# Patient Record
Sex: MALE | Race: WHITE | NOT HISPANIC OR LATINO | Employment: OTHER | URBAN - METROPOLITAN AREA
[De-identification: names, ages, dates, MRNs, and addresses within clinical notes are randomized per-mention and may not be internally consistent; named-entity substitution may affect disease eponyms.]

---

## 2017-07-17 ENCOUNTER — OFFICE VISIT (OUTPATIENT)
Dept: MEDICAL GROUP | Facility: PHYSICIAN GROUP | Age: 32
End: 2017-07-17
Payer: OTHER GOVERNMENT

## 2017-07-17 VITALS
TEMPERATURE: 98.4 F | DIASTOLIC BLOOD PRESSURE: 76 MMHG | RESPIRATION RATE: 14 BRPM | WEIGHT: 176 LBS | OXYGEN SATURATION: 95 % | BODY MASS INDEX: 23.33 KG/M2 | SYSTOLIC BLOOD PRESSURE: 120 MMHG | HEIGHT: 73 IN | HEART RATE: 85 BPM

## 2017-07-17 DIAGNOSIS — R06.02 SHORTNESS OF BREATH: ICD-10-CM

## 2017-07-17 DIAGNOSIS — Z76.89 ENCOUNTER TO ESTABLISH CARE: ICD-10-CM

## 2017-07-17 DIAGNOSIS — R00.2 HEART PALPITATIONS: ICD-10-CM

## 2017-07-17 PROCEDURE — 93000 ELECTROCARDIOGRAM COMPLETE: CPT | Performed by: NURSE PRACTITIONER

## 2017-07-17 PROCEDURE — 99204 OFFICE O/P NEW MOD 45 MIN: CPT | Performed by: NURSE PRACTITIONER

## 2017-07-17 RX ORDER — IBUPROFEN 200 MG
200 TABLET ORAL EVERY 6 HOURS PRN
COMMUNITY
End: 2017-12-19

## 2017-07-17 RX ORDER — ALBUTEROL SULFATE 90 UG/1
2 AEROSOL, METERED RESPIRATORY (INHALATION) EVERY 6 HOURS PRN
Qty: 8.5 G | Refills: 1 | Status: SHIPPED
Start: 2017-07-17 | End: 2017-12-19

## 2017-07-17 ASSESSMENT — PATIENT HEALTH QUESTIONNAIRE - PHQ9: CLINICAL INTERPRETATION OF PHQ2 SCORE: 0

## 2017-07-17 NOTE — ASSESSMENT & PLAN NOTE
During [employer] physical test he felt some irregular heart beats.  Only when he really exerts himself.  Lasted 20 minutes.  Felt like he was going to pass out.  [place] that he over-exerted himself.  Felt concerned, but no chest pain.  Positive for nausea.  Has had this occur one or two times when not exerting himself.

## 2017-07-17 NOTE — PROGRESS NOTES
Romero Baugh is a 31 y.o. male here today to establish care and for evaluation and management of:    HPI:    Shortness of breath  Patient reports new onset of shortness of breath especially with physical training and exercise. He is an [employer] . Patient states that he is not sure if this is allergies or to problems with the fire. He denies chest pain wheezing recent fever or chills.     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  Only when he really exerts himself.  Lasted 20 minutes.  Felt like he was going to pass out.  [place] that he over-exerted himself.  Felt concerned, but no chest pain.  Positive for nausea.  Has had this occur one or two times when not exerting himself.      Encounter to establish care  Patient here to establish care. Romero is a 31-year-old white male. We reviewed medical and surgical history. He has an allergy to shellfish that causes swelling in his throat. He has had a septorhinoplasty done for chronic sinus issues and deviated septum. He is not currently on any prescription medication.      Current medicines (including changes today)  Current Outpatient Prescriptions   Medication Sig Dispense Refill   • ibuprofen (MOTRIN) 200 MG Tab Take 200 mg by mouth every 6 hours as needed.     • albuterol 108 (90 BASE) MCG/ACT Aero Soln inhalation aerosol Inhale 2 Puffs by mouth every 6 hours as needed for Shortness of Breath. 8.5 g 1     No current facility-administered medications for this visit.       He  has no past medical history on file.    He  has past surgical history that includes septorhinoplasty (2009).    Social History   Substance Use Topics   • Smoking status: Former Smoker -- 1.00 packs/day for 15 years     Types: Cigarettes     Quit date: 03/01/2016   • Smokeless tobacco: Former User   • Alcohol Use: No       Social History     Social History Narrative   • No narrative on file       Family History   Problem Relation Age of Onset   •  "Heart Disease Mother      valve replacment   • Hypertension Father    • Stroke Father    • Alcohol/Drug Father        Family Status   Relation Status Death Age   • Mother Alive    • Father Alive    • Sister Alive          ROS  As stated in history of present illness.  All other systems reviewed and are negative     Objective:     Blood pressure 120/76, pulse 85, temperature 36.9 °C (98.4 °F), resp. rate 14, height 1.854 m (6' 1\"), weight 79.833 kg (176 lb), SpO2 95 %. Body mass index is 23.23 kg/(m^2).  Physical Exam:    Constitutional: Alert, no distress.  Skin: Warm, dry, good turgor, no rashes in visible areas.  Eye: Equal, round and reactive, conjunctiva clear, lids normal.  ENMT: Lips without lesions, good dentition, oropharynx clear.  Neck: Trachea midline, no masses, no thyromegaly. No cervical or supraclavicular lymphadenopathy.  Respiratory: Unlabored respiratory effort, lungs clear to auscultation, no wheezes, no ronchi.  Cardiovascular: Normal S1, S2, no murmur, no edema.  Abdomen: Soft, non-tender, no masses, no hepatosplenomegaly.  Psych: Alert and oriented x3, normal affect and mood.        Assessment and Plan:   The following treatment plan was discussed    1. Encounter to establish care  31-year-old white male here to establish care. Reviewed medical and surgical history. Patient is not currently on any medications. He does have new onset of shortness of breath with exertion and a history of some heart palpitations.    2. Shortness of breath  This is a new problem to me. Acute. Ongoing. This could represent some exercise-induced asthma however on exam he had no wheezing. This could also represent allergies. Recommended that he try over-the-counter allergy medication and Flonase to see if this improves his symptoms. Also gave the patient an albuterol inhaler to try if he gets short of breath to see if this improves his symptoms. I did caution the patient that an albuterol inhaler can cause some heart " palpitations. We'll monitor and follow.    3. Heart palpitations  This is a new problem to me. Acute. Unsure of cause. EKG in the office showed normal sinus rhythm, no ST elevations, no bundle branch block or SVT or PVCs. We will draw lab work to rule out any metabolic issues including thyroid. Discussed with patient that if he were to have these symptoms again to try the Valsalva maneuver to see if this would stop his palpitations. ED precautions reviewed carefully with patient monitor and follow.  - COMP METABOLIC PANEL; Future  - TSH; Future  - LIPID PROFILE; Future  - EKG - Clinic Performed      Records requested.  Followup: Return if symptoms worsen or fail to improve.

## 2017-07-17 NOTE — MR AVS SNAPSHOT
"        Romero Baugh   2017 2:40 PM   Office Visit   MRN: [id number]    Department:  [facility]   Dept Phone:  [phone]    Description:  Male : 1985   Provider:  SHILO Hdz           Reason for Visit     Establish Care     Breathing Problem           Allergies as of 2017     Allergen Noted Reactions    Iodine 2017       Shellfish Allergy 2017         You were diagnosed with     Encounter to establish care   [204497]       Shortness of breath   [786.05.ICD-9-CM]       Heart palpitations   [822165]         Vital Signs     Blood Pressure Pulse Temperature Respirations Height Weight    120/76 mmHg 85 36.9 °C (98.4 °F) 14 1.854 m (6' 1\") 79.833 kg (176 lb)    Body Mass Index Oxygen Saturation Smoking Status             23.23 kg/m2 95% Former Smoker         Basic Information     Date Of Birth Sex Race Ethnicity Preferred Language    1985 Male White Non- English      Problem List              ICD-10-CM Priority Class Noted - Resolved    Encounter to establish care Z76.89   2017 - Present    Shortness of breath R06.02   2017 - Present    Heart palpitations R00.2   2017 - Present      Health Maintenance     Patient has no pending health maintenance at this time      Results     EKG - Clinic Performed                   Current Immunizations     No immunizations on file.      Below and/or attached are the medications your provider expects you to take. Review all of your home medications and newly ordered medications with your provider and/or pharmacist. Follow medication instructions as directed by your provider and/or pharmacist. Please keep your medication list with you and share with your provider. Update the information when medications are discontinued, doses are changed, or new medications (including over-the-counter products) are added; and carry medication information at all times in the event of emergency situations     Allergies:  " IODINE - (reactions not documented)     SHELLFISH ALLERGY - (reactions not documented)               Medications  Valid as of: July 17, 2017 -  3:35 PM    Generic Name Brand Name Tablet Size Instructions for use    Albuterol Sulfate (Aero Soln) albuterol 108 (90 BASE) MCG/ACT Inhale 2 Puffs by mouth every 6 hours as needed for Shortness of Breath.        Ibuprofen (Tab) MOTRIN 200 MG Take 200 mg by mouth every 6 hours as needed.        .                 Medicines prescribed today were sent to:     [id number]  [place], [place] - [address] AT [facility]    [address] [place] [address]    Phone: [phone] Fax: [phone]    Open 24 Hours?: No      Medication refill instructions:       If your prescription bottle indicates you have medication refills left, it is not necessary to call your provider’s office. Please contact your pharmacy and they will refill your medication.    If your prescription bottle indicates you do not have any refills left, you may request refills at any time through one of the following ways: The online [employer] system (except Urgent Care), by calling your provider’s office, or by asking your pharmacy to contact your provider’s office with a refill request. Medication refills are processed only during regular business hours and may not be available until the next business day. Your provider may request additional information or to have a follow-up visit with you prior to refilling your medication.   *Please Note: Medication refills are assigned a new Rx number when refilled electronically. Your pharmacy may indicate that no refills were authorized even though a new prescription for the same medication is available at the pharmacy. Please request the medicine by name with the pharmacy before contacting your provider for a refill.        Your To Do List     Future Labs/Procedures Complete By Expires    COMP METABOLIC PANEL  As directed 7/18/2018    LIPID  PROFILE  As directed 7/18/2018    TSH  As directed 7/18/2018         D[employer] Access Code: [email or web address]  Expires: 8/5/2017 10:24 AM    Dinsmore Steele  A secure, online tool to manage your health information     [employer]’s Dinsmore Steele® is a secure, online tool that connects you to your personalized health information from the privacy of your home -- day or night - making it very easy for you to manage your healthcare. Once the activation process is completed, you can even access your medical information using the Dinsmore Steele pio, which is available for free in the Apple Pio store or Google Play store.     Dinsmore Steele provides the following levels of access (as shown below):   My Chart Features   [facility] Doctor [facility]  Specialists [facility]  Urgent  Care Non-[facility]  Primary Care  Doctor   Email your healthcare team securely and privately 24/7 X X X    Manage appointments: schedule your next appointment; view details of past/upcoming appointments X      Request prescription refills. X      View recent personal medical records, including lab and immunizations X X X X   View health record, including health history, allergies, medications X X X X   Read reports about your outpatient visits, procedures, consult and ER notes X X X X   See your discharge summary, which is a recap of your hospital and/or ER visit that includes your diagnosis, lab results, and care plan. X X       How to register for Dinsmore Steele:  1. Go to  [email or web address].  2. Click on the Sign Up Now box, which takes you to the New Member Sign Up page. You will need to provide the following information:  a. Enter your Dinsmore Steele Access Code exactly as it appears at the top of this page. (You will not need to use this code after you’ve completed the sign-up process. If you do not sign up before the expiration date, you must request a new code.)   b. Enter your date of birth.   c. Enter your home email address.   d. Click Submit, and follow the next  screen’s instructions.  3. Create a [employer] ID. This will be your [employer] login ID and cannot be changed, so think of one that is secure and easy to remember.  4. Create a [employer] password. You can change your password at any time.  5. Enter your Password Reset Question and Answer. This can be used at a later time if you forget your password.   6. Enter your e-mail address. This allows you to receive e-mail notifications when new information is available in [employer].  7. Click Sign Up. You can now view your health information.    For assistance activating your [employer] account, call [phone]

## 2017-07-17 NOTE — ASSESSMENT & PLAN NOTE
Patient here to establish care. Romero is a 31-year-old white male. We reviewed medical and surgical history. He has an allergy to shellfish that causes swelling in his throat. He has had a septorhinoplasty done for chronic sinus issues and deviated septum. He is not currently on any prescription medication.

## 2017-07-17 NOTE — ASSESSMENT & PLAN NOTE
Patient reports new onset of shortness of breath especially with physical training and exercise. He is an [employer] . Patient states that he is not sure if this is allergies or to problems with the fire. He denies chest pain wheezing recent fever or chills.

## 2017-12-06 ENCOUNTER — OFFICE VISIT (OUTPATIENT)
Dept: URGENT CARE | Facility: PHYSICIAN GROUP | Age: 32
End: 2017-12-06
Payer: OTHER GOVERNMENT

## 2017-12-06 VITALS
TEMPERATURE: 98.4 F | BODY MASS INDEX: 23.86 KG/M2 | OXYGEN SATURATION: 95 % | SYSTOLIC BLOOD PRESSURE: 112 MMHG | DIASTOLIC BLOOD PRESSURE: 80 MMHG | HEART RATE: 82 BPM | HEIGHT: 73 IN | WEIGHT: 180 LBS

## 2017-12-06 DIAGNOSIS — J01.00 ACUTE NON-RECURRENT MAXILLARY SINUSITIS: ICD-10-CM

## 2017-12-06 LAB
FLUAV+FLUBV AG SPEC QL IA: NORMAL
INT CON NEG: NEGATIVE
INT CON NEG: NEGATIVE
INT CON POS: POSITIVE
INT CON POS: POSITIVE
S PYO AG THROAT QL: NORMAL

## 2017-12-06 PROCEDURE — 87880 STREP A ASSAY W/OPTIC: CPT | Performed by: PHYSICIAN ASSISTANT

## 2017-12-06 PROCEDURE — 87804 INFLUENZA ASSAY W/OPTIC: CPT | Performed by: PHYSICIAN ASSISTANT

## 2017-12-06 PROCEDURE — 99213 OFFICE O/P EST LOW 20 MIN: CPT | Performed by: PHYSICIAN ASSISTANT

## 2017-12-06 ASSESSMENT — ENCOUNTER SYMPTOMS
MUSCULOSKELETAL NEGATIVE: 1
FEVER: 1
NAUSEA: 0
ABDOMINAL PAIN: 0
HEADACHES: 0
SPUTUM PRODUCTION: 0
CHILLS: 0
SHORTNESS OF BREATH: 0
DIARRHEA: 0
SINUS PAIN: 1
VOMITING: 0
DIZZINESS: 0
SORE THROAT: 1
COUGH: 1

## 2017-12-06 NOTE — LETTER
December 6, 2017         Patient: Romero Baugh   YOB: 1985   Date of Visit: 12/6/2017           To Whom it May Concern:    Romero Baugh was seen in my clinic on 12/6/2017. Please excuse his absence today, 12/6/17.    If you have any questions or concerns, please don't hesitate to call.        Sincerely,           Virginia Ashley P.A.-C.  Electronically Signed

## 2017-12-07 NOTE — PROGRESS NOTES
"Subjective:      Romero Baugh is a 32 y.o. male who presents with Fever (101. x 2 days, Sore throat, headache, chills.)            Fever    This is a new problem. The current episode started yesterday. The problem occurs constantly. The problem has been resolved. The maximum temperature noted was 101 to 101.9 F. The temperature was taken using an oral thermometer. Associated symptoms include congestion, coughing and a sore throat. Pertinent negatives include no abdominal pain, chest pain, diarrhea, ear pain, headaches, nausea, rash or vomiting. He has tried NSAIDs and acetaminophen for the symptoms.   Risk factors: recent sickness    Risk factors: no sick contacts      Patient presents to urgent care reporting a 2 day history of sinus congestions, dry cough, headaches, sore throat, and fever (highest measured 101 F yesterday). He has no known medical problems and doesn't take any regular medications. He has received his flu shot this year.     Review of Systems   Constitutional: Positive for fever. Negative for chills.   HENT: Positive for congestion, sinus pain and sore throat. Negative for ear pain.    Respiratory: Positive for cough. Negative for sputum production and shortness of breath.    Cardiovascular: Negative for chest pain.   Gastrointestinal: Negative for abdominal pain, diarrhea, nausea and vomiting.   Genitourinary: Negative.    Musculoskeletal: Negative.    Skin: Negative for rash.   Neurological: Negative for dizziness and headaches.        Objective:     /80   Pulse 82   Temp 36.9 °C (98.4 °F)   Ht 1.854 m (6' 1\")   Wt 81.6 kg (180 lb)   SpO2 95%   BMI 23.75 kg/m²      Physical Exam   Constitutional: He is oriented to person, place, and time. He appears well-developed and well-nourished. No distress.   HENT:   Head: Normocephalic and atraumatic.   Right Ear: Hearing, tympanic membrane, external ear and ear canal normal.   Left Ear: Hearing, tympanic membrane, external ear and ear " canal normal.   Nose: Mucosal edema and rhinorrhea present. Right sinus exhibits no maxillary sinus tenderness and no frontal sinus tenderness. Left sinus exhibits no maxillary sinus tenderness and no frontal sinus tenderness.   Mouth/Throat: Posterior oropharyngeal erythema present. No oropharyngeal exudate or posterior oropharyngeal edema.   Mild posterior oropharyngeal erythema without enlarged tonsils or exudate noted.    Eyes: Conjunctivae are normal. Pupils are equal, round, and reactive to light. Right eye exhibits no discharge. Left eye exhibits no discharge.   Neck: Normal range of motion.   Cardiovascular: Normal rate, regular rhythm and normal heart sounds.    No murmur heard.  Pulmonary/Chest: Effort normal and breath sounds normal. No respiratory distress. He has no wheezes.   Musculoskeletal: Normal range of motion.   Neurological: He is alert and oriented to person, place, and time.   Skin: Skin is warm and dry. He is not diaphoretic.   Psychiatric: He has a normal mood and affect. His behavior is normal.   Nursing note and vitals reviewed.         PMH:  has no past medical history on file.  MEDS:   Current Outpatient Prescriptions:   •  ibuprofen (MOTRIN) 200 MG Tab, Take 200 mg by mouth every 6 hours as needed., Disp: , Rfl:   •  albuterol 108 (90 BASE) MCG/ACT Aero Soln inhalation aerosol, Inhale 2 Puffs by mouth every 6 hours as needed for Shortness of Breath., Disp: 8.5 g, Rfl: 1  ALLERGIES:   Allergies   Allergen Reactions   • Iodine    • Shellfish Allergy      SURGHX:   Past Surgical History:   Procedure Laterality Date   • SEPTORHINOPLASTY  2009     SOCHX:  reports that he quit smoking about 21 months ago. His smoking use included Cigarettes. He has a 15.00 pack-year smoking history. He has quit using smokeless tobacco. He reports that he does not drink alcohol or use drugs.  FH: family history includes Alcohol/Drug in his father; Heart Disease in his mother; Hypertension in his father; Stroke  in his father.       Assessment/Plan:     1. Acute non-recurrent maxillary sinusitis  - POCT Influenza A/B: NEGATIVE  - POCT Rapid Strep A: NEGATIVE    Advised patient symptoms are most likely viral in etiology, recommend supportive care. Increased fluids and rest. Discussed use of nedi-pot, humidifier, and Flonase nasal spray for symptomatic relief. Call or return to office if symptoms persist or worsen. Work note given at today's visit. The patient demonstrated a good understanding and agreed with the treatment plan.

## 2017-12-19 ENCOUNTER — APPOINTMENT (OUTPATIENT)
Dept: RADIOLOGY | Facility: MEDICAL CENTER | Age: 32
End: 2017-12-19
Attending: EMERGENCY MEDICINE
Payer: OTHER GOVERNMENT

## 2017-12-19 ENCOUNTER — HOSPITAL ENCOUNTER (EMERGENCY)
Facility: MEDICAL CENTER | Age: 32
End: 2017-12-19
Attending: EMERGENCY MEDICINE
Payer: OTHER GOVERNMENT

## 2017-12-19 ENCOUNTER — TELEPHONE (OUTPATIENT)
Dept: MEDICAL GROUP | Facility: PHYSICIAN GROUP | Age: 32
End: 2017-12-19

## 2017-12-19 VITALS
OXYGEN SATURATION: 96 % | HEART RATE: 109 BPM | DIASTOLIC BLOOD PRESSURE: 63 MMHG | SYSTOLIC BLOOD PRESSURE: 111 MMHG | TEMPERATURE: 100.4 F | HEIGHT: 74 IN | WEIGHT: 181.66 LBS | RESPIRATION RATE: 18 BRPM | BODY MASS INDEX: 23.31 KG/M2

## 2017-12-19 DIAGNOSIS — R50.9 FEVER, UNSPECIFIED FEVER CAUSE: ICD-10-CM

## 2017-12-19 DIAGNOSIS — J11.1 INFLUENZA: ICD-10-CM

## 2017-12-19 PROCEDURE — 71010 DX-CHEST-PORTABLE (1 VIEW): CPT

## 2017-12-19 PROCEDURE — A9270 NON-COVERED ITEM OR SERVICE: HCPCS | Performed by: EMERGENCY MEDICINE

## 2017-12-19 PROCEDURE — 700102 HCHG RX REV CODE 250 W/ 637 OVERRIDE(OP): Performed by: EMERGENCY MEDICINE

## 2017-12-19 PROCEDURE — 99284 EMERGENCY DEPT VISIT MOD MDM: CPT

## 2017-12-19 RX ORDER — OSELTAMIVIR PHOSPHATE 75 MG/1
75 CAPSULE ORAL 2 TIMES DAILY
Qty: 10 CAP | Refills: 0 | Status: SHIPPED | OUTPATIENT
Start: 2017-12-19 | End: 2017-12-24

## 2017-12-19 RX ORDER — IBUPROFEN 600 MG/1
600 TABLET ORAL ONCE
Status: COMPLETED | OUTPATIENT
Start: 2017-12-19 | End: 2017-12-19

## 2017-12-19 RX ORDER — ACETAMINOPHEN 325 MG/1
975 TABLET ORAL ONCE
Status: COMPLETED | OUTPATIENT
Start: 2017-12-19 | End: 2017-12-19

## 2017-12-19 RX ADMIN — ACETAMINOPHEN 975 MG: 325 TABLET, FILM COATED ORAL at 09:54

## 2017-12-19 RX ADMIN — IBUPROFEN 600 MG: 600 TABLET, FILM COATED ORAL at 09:54

## 2017-12-19 ASSESSMENT — PAIN SCALES - GENERAL: PAINLEVEL_OUTOF10: 6

## 2017-12-19 NOTE — ED PROVIDER NOTES
"ED Provider Note    CHIEF COMPLAINT  Chief Complaint   Patient presents with   • Flu Like Symptoms       HPI  Romero Baugh is a 32 y.o. male who presents To the emergency department with a chief complaint of fevers, chills, body aches, generally feeling terrible. The patient had some mild symptoms starting about 10 days ago but things seem to get worse yesterday. He developed a fever to 102° yesterday morning. He's had body aches, nausea, vomiting, diarrhea and general malaise.    REVIEW OF SYSTEMS  See HPI for further details. All other systems are negative.     PAST MEDICAL HISTORY  No past medical history on file.    FAMILY HISTORY  Family History   Problem Relation Age of Onset   • Heart Disease Mother      valve replacment   • Hypertension Father    • Stroke Father    • Alcohol/Drug Father        SOCIAL HISTORY  Social History     Social History   • Marital status:      Spouse name: N/A   • Number of children: N/A   • Years of education: N/A     Social History Main Topics   • Smoking status: Former Smoker     Packs/day: 1.00     Years: 15.00     Types: Cigarettes     Quit date: 3/1/2016   • Smokeless tobacco: Former User   • Alcohol use No   • Drug use: No   • Sexual activity: Yes     Partners: Female     Other Topics Concern   • Not on file     Social History Narrative   • No narrative on file       SURGICAL HISTORY  Past Surgical History:   Procedure Laterality Date   • SEPTORHINOPLASTY  2009       CURRENT MEDICATIONS  Home Medications     Reviewed by Gina Joiner (Pharmacy Tech) on 12/19/17 at 0936  Med List Status: Complete   Medication Last Dose Status   pseudoephedrine-ibuprofen (CHILDRENS ADVIL COLD)  MG/5ML suspension 12/18/2017 Active                ALLERGIES  Allergies   Allergen Reactions   • Iodine    • Shellfish Allergy        PHYSICAL EXAM  VITAL SIGNS: /63   Pulse (!) 109   Temp 38 °C (100.4 °F)   Resp 18   Ht 1.88 m (6' 2\")   Wt 82.4 kg (181 lb 10.5 oz)   " SpO2 96%   BMI 23.32 kg/m²   Constitutional: Well developed, Well nourished, No acute distress, Non-toxic appearance.   HENT: Normocephalic, Atraumatic, Bilateral external ears normal, Oropharynx moist, No oral exudates, Nose normal.   Eyes: PERRLA, EOMI, Conjunctiva normal, No discharge.   Neck: Normal range of motion, No tenderness, Supple, No stridor.   Cardiovascular: Tachycardic, regular rhythm, no murmurs.   Thorax & Lungs: Lungs are clear to auscultation bilaterally. No rales, rhonchi, or wheezing. Normal work of breathing.  Abdomen: Bowel sounds normal, Soft, No tenderness, No masses, No pulsatile masses.   Skin: Warm, Dry, No erythema, No rash.   Back: No tenderness, No CVA tenderness.   Extremities: Intact distal pulses, No tenderness, No cyanosis, No clubbing.  Neurologic: Alert & oriented x 3, Normal motor function, Normal sensory function, No focal deficits noted.     EKG      RADIOLOGY/PROCEDURES      COURSE & MEDICAL DECISION MAKING  Pertinent Labs & Imaging studies reviewed. (See chart for details)    The patient presents to the emergency department with flulike symptoms. This occurs within the context of multiple patients presenting to the emergency department with influenza. Chest x-ray is obtained and does not reveal any evidence of pneumonia. Fever started yesterday. Therefore I do feel that the patient is in the window for treatment with Tamiflu.    Otherwise, recommended symptomatically control. Patient discharged home in stable condition.    FINAL IMPRESSION  1. Influenza              Electronically signed by: Jose Coulter, 12/19/2017 11:07 AM

## 2017-12-19 NOTE — DISCHARGE INSTRUCTIONS
"Influenza, Adult  Influenza (\"the flu\") is a viral infection of the respiratory tract. It occurs more often in winter months because people spend more time in close contact with one another. Influenza can make you feel very sick. Influenza easily spreads from person to person (contagious).  CAUSES   Influenza is caused by a virus that infects the respiratory tract. You can catch the virus by breathing in droplets from an infected person's cough or sneeze. You can also catch the virus by touching something that was recently contaminated with the virus and then touching your mouth, nose, or eyes.  RISKS AND COMPLICATIONS  You may be at risk for a more severe case of influenza if you smoke cigarettes, have diabetes, have chronic heart disease (such as heart failure) or lung disease (such as asthma), or if you have a weakened immune system. Elderly people and pregnant women are also at risk for more serious infections. The most common problem of influenza is a lung infection (pneumonia). Sometimes, this problem can require emergency medical care and may be life threatening.  SIGNS AND SYMPTOMS   Symptoms typically last 4 to 10 days and may include:  · Fever.  · Chills.  · Headache, body aches, and muscle aches.  · Sore throat.  · Chest discomfort and cough.  · Poor appetite.  · Weakness or feeling tired.  · Dizziness.  · Nausea or vomiting.  DIAGNOSIS   Diagnosis of influenza is often made based on your history and a physical exam. A nose or throat swab test can be done to confirm the diagnosis.  TREATMENT   In mild cases, influenza goes away on its own. Treatment is directed at relieving symptoms. For more severe cases, your health care provider may prescribe antiviral medicines to shorten the sickness. Antibiotic medicines are not effective because the infection is caused by a virus, not by bacteria.  HOME CARE INSTRUCTIONS  · Take medicines only as directed by your health care provider.  · Use a cool mist humidifier " to make breathing easier.  · Get plenty of rest until your temperature returns to normal. This usually takes 3 to 4 days.  · Drink enough fluid to keep your urine clear or pale yellow.  · Cover your mouth and nose when coughing or sneezing, and wash your hands well to prevent the virus from spreading.  · Stay home from work or school until the fever is gone for at least 1 full day.  PREVENTION   An annual influenza vaccination (flu shot) is the best way to avoid getting influenza. An annual flu shot is now routinely recommended for all adults in the [place]  SEEK MEDICAL CARE IF:  · You experience chest pain, your cough worsens, or you produce more mucus.  · You have nausea, vomiting, or diarrhea.  · Your fever returns or gets worse.  SEEK IMMEDIATE MEDICAL CARE IF:  · You have trouble breathing, you become short of breath, or your skin or nails become bluish.  · You have severe pain or stiffness in the neck.  · You develop a sudden headache, or pain in the face or ear.  · You have nausea or vomiting that you cannot control.  MAKE SURE YOU:   · Understand these instructions.  · Will watch your condition.  · Will get help right away if you are not doing well or get worse.     This information is not intended to replace advice given to you by your health care provider. Make sure you discuss any questions you have with your health care provider.     Document Released: 12/15/2001 Document Revised: 01/08/2016 Document Reviewed: 03/18/2013  [employer] Patient Education ©2016 [employer]

## 2017-12-19 NOTE — TELEPHONE ENCOUNTER
VOICEMAIL  1. Caller Name:Romero Baugh                                                                  Call Back Number: [phone] (home)       2. Message: Patient has been up all night with fever and chills, he has  and needs a referral to be seen in UC. Please place URGENT referral to UC.     3. Patient approves office to leave a detailed voicemail/MyChart message: N\A

## 2018-03-19 ENCOUNTER — OFFICE VISIT (OUTPATIENT)
Dept: URGENT CARE | Facility: PHYSICIAN GROUP | Age: 33
End: 2018-03-19
Payer: OTHER GOVERNMENT

## 2018-03-19 ENCOUNTER — HOSPITAL ENCOUNTER (OUTPATIENT)
Dept: RADIOLOGY | Facility: MEDICAL CENTER | Age: 33
End: 2018-03-19
Attending: NURSE PRACTITIONER
Payer: OTHER GOVERNMENT

## 2018-03-19 VITALS
TEMPERATURE: 97.6 F | RESPIRATION RATE: 14 BRPM | OXYGEN SATURATION: 96 % | HEART RATE: 78 BPM | WEIGHT: 180 LBS | HEIGHT: 72 IN | DIASTOLIC BLOOD PRESSURE: 78 MMHG | SYSTOLIC BLOOD PRESSURE: 124 MMHG | BODY MASS INDEX: 24.38 KG/M2

## 2018-03-19 DIAGNOSIS — S69.92XA INJURY OF LEFT WRIST, INITIAL ENCOUNTER: ICD-10-CM

## 2018-03-19 DIAGNOSIS — S62.112A CLOSED DISPLACED FRACTURE OF TRIQUETRUM OF LEFT WRIST, INITIAL ENCOUNTER: ICD-10-CM

## 2018-03-19 PROCEDURE — 73110 X-RAY EXAM OF WRIST: CPT | Mod: LT

## 2018-03-19 PROCEDURE — 99213 OFFICE O/P EST LOW 20 MIN: CPT | Performed by: NURSE PRACTITIONER

## 2018-03-19 ASSESSMENT — PAIN SCALES - GENERAL: PAINLEVEL: 4=SLIGHT-MODERATE PAIN
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
## 2018-03-20 ENCOUNTER — TELEPHONE (OUTPATIENT)
Dept: MEDICAL GROUP | Facility: PHYSICIAN GROUP | Age: 33
End: 2018-03-20

## 2018-03-20 DIAGNOSIS — S62.112A CLOSED DISPLACED FRACTURE OF TRIQUETRUM OF LEFT WRIST, INITIAL ENCOUNTER: ICD-10-CM

## 2018-03-20 ASSESSMENT — ENCOUNTER SYMPTOMS
RESPIRATORY NEGATIVE: 1
HEADACHES: 0
BACK PAIN: 0
CARDIOVASCULAR NEGATIVE: 1
TINGLING: 1
GASTROINTESTINAL NEGATIVE: 1
NECK PAIN: 0
FOCAL WEAKNESS: 0
CONSTITUTIONAL NEGATIVE: 1
LOSS OF CONSCIOUSNESS: 0
FALLS: 0
DIZZINESS: 0

## 2018-03-20 NOTE — TELEPHONE ENCOUNTER
Urgent referral to [facility] placed for S62.112A  Left wrist displaced fracture.   GERALD Hdz.

## 2018-03-20 NOTE — TELEPHONE ENCOUNTER
1. Caller Name: Romero Baugh                                         Call Back Number: [phone] (home)       Patient approves a detailed voicemail message: N\A    Patient called for an update on a referrals to [facility] for his fractured wrist. A referral was submitted to referral department  And looks like Submitted to . Called the  to transfer me to referral department to follow up and she transfer me to Anaid the supervisor left message on her voice mail

## 2018-03-20 NOTE — TELEPHONE ENCOUNTER
VOICEMAIL  1. Caller Name: Romero                      Call Back Number: [phone] (home)       2. Message: Patient needs an URGENT referral to [facility] for S62.112A (ICD-10-CM) - Closed displaced fracture of triquetrum of left wrist, initial encounter.    3. Patient approves office to leave a detailed voicemail/MyChart message: N\A

## 2018-03-20 NOTE — TELEPHONE ENCOUNTER
PER RAH FROST @ [facility], CARLOS IS ONLY DOING WAVERS, SHE WILL FAX TO [facility]

## 2018-05-09 ENCOUNTER — OFFICE VISIT (OUTPATIENT)
Dept: INTERNAL MEDICINE | Facility: MEDICAL CENTER | Age: 33
End: 2018-05-09
Payer: OTHER GOVERNMENT

## 2018-05-09 VITALS
BODY MASS INDEX: 25.87 KG/M2 | HEART RATE: 104 BPM | WEIGHT: 191 LBS | SYSTOLIC BLOOD PRESSURE: 116 MMHG | HEIGHT: 72 IN | OXYGEN SATURATION: 93 % | DIASTOLIC BLOOD PRESSURE: 68 MMHG | TEMPERATURE: 98.6 F

## 2018-05-09 DIAGNOSIS — S62.102D CLOSED FRACTURE OF LEFT WRIST WITH ROUTINE HEALING, SUBSEQUENT ENCOUNTER: ICD-10-CM

## 2018-05-09 PROCEDURE — 99212 OFFICE O/P EST SF 10 MIN: CPT | Mod: GE | Performed by: INTERNAL MEDICINE

## 2018-05-09 RX ORDER — IBUPROFEN 200 MG
200 TABLET ORAL EVERY 6 HOURS PRN
COMMUNITY
End: 2020-03-10

## 2018-05-09 ASSESSMENT — PAIN SCALES - GENERAL: PAINLEVEL: 2=MINIMAL-SLIGHT
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
## 2018-05-09 NOTE — PATIENT INSTRUCTIONS
Continue to work on strengthening your wrist - can use stress ball for exercise  Refrain from lifting heavy weights.  Follow up as needed for worsening symptoms
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
## 2018-10-29 ENCOUNTER — OFFICE VISIT (OUTPATIENT)
Dept: MEDICAL GROUP | Facility: PHYSICIAN GROUP | Age: 33
End: 2018-10-29
Payer: OTHER GOVERNMENT

## 2018-10-29 VITALS
OXYGEN SATURATION: 96 % | BODY MASS INDEX: 25.47 KG/M2 | WEIGHT: 188 LBS | HEART RATE: 80 BPM | TEMPERATURE: 98.5 F | SYSTOLIC BLOOD PRESSURE: 106 MMHG | HEIGHT: 72 IN | DIASTOLIC BLOOD PRESSURE: 64 MMHG

## 2018-10-29 DIAGNOSIS — J06.9 UPPER RESPIRATORY TRACT INFECTION, UNSPECIFIED TYPE: ICD-10-CM

## 2018-10-29 LAB
FLUAV+FLUBV AG SPEC QL IA: NORMAL
INT CON NEG: NORMAL
INT CON POS: NORMAL

## 2018-10-29 PROCEDURE — 99213 OFFICE O/P EST LOW 20 MIN: CPT | Performed by: PHYSICIAN ASSISTANT

## 2018-10-29 PROCEDURE — 87804 INFLUENZA ASSAY W/OPTIC: CPT | Performed by: PHYSICIAN ASSISTANT

## 2018-10-29 RX ORDER — FLUTICASONE PROPIONATE 50 MCG
1 SPRAY, SUSPENSION (ML) NASAL DAILY
COMMUNITY
End: 2020-03-10

## 2018-10-29 NOTE — PROGRESS NOTES
Subjective:   Romero Baugh is a 33 y.o. male here today for nasal congestion. Is an established patient of EMELIA Hdz.    HPI:    Patient presents to the office today with complaints of upper respiratory symptoms.  He states that for the last 2 days, he has had nasal congestion, sinus headache, and cough.  Has noticed a lot of postnasal drainage which makes him nauseous at times.  Has had persistent fever since symptom onset.  T-max up to 102 at home accompanied by nocturnal chills.  Also complains of body aches and fatigue.  Feels short of breath which has been an ongoing issue.  His PCP previously prescribed him albuterol but states he has been afraid to start this as he does not like taking medications.  Has been using DayQuil and NyQuil at home without much symptomatic relief.  Does endorse getting the flu shot a couple of weeks ago.      Current medicines (including changes today)  Current Outpatient Prescriptions   Medication Sig Dispense Refill   • Pseudoeph-Doxylamine-DM-APAP (DAYQUIL/NYQUIL COLD/FLU RELIEF PO) Take  by mouth.     • fluticasone (FLONASE) 50 MCG/ACT nasal spray Spray 1 Spray in nose every day.     • ibuprofen (MOTRIN) 200 MG Tab Take 200 mg by mouth every 6 hours as needed.     • asa/apap/caffeine (EXCEDRIN) 250-250-65 MG Tab Take 1 Tab by mouth every 6 hours as needed for Headache.       No current facility-administered medications for this visit.      He  has no past medical history on file.    ROS  Ear ROS: No ear pain  Mouth/Throat ROS: No sore throat  Neck ROS: Positive for swollen glands       Objective:     Blood pressure 106/64, pulse 80, temperature 36.9 °C (98.5 °F), height 1.829 m (6'), weight 85.3 kg (188 lb), SpO2 96 %. Body mass index is 25.5 kg/m².     Physical Exam:  Constitutional: Alert, well-appearing, no distress.  Skin: Warm, dry, good turgor, no rashes in visible areas.  Eye: Pupils are equal and round, conjunctiva clear, lids normal.  ENMT: External ear  canals show mild to moderate amount of dried cerumen but no erythema or edema.  Tympanic membranes not visible due to wax.  Nasal turbinates appear mildly inflamed and injected with no rhinorrhea currently visible.  Lips without lesions, moist mucus membranes.  Mild pharyngeal erythema.  Neck: No masses.  Mild non-tender submandibular adenopathy bilaterally. No cervical lymphadenopathy.  Respiratory: Unlabored respiratory effort, SPO2 96% on room air.  Lungs clear to auscultation, no wheezes, no rhonchi.  Cardiovascular: Normal S1, S2, no murmur.      Assessment and Plan:   The following treatment plan was discussed    1. Upper respiratory tract infection, unspecified type  New problem, uncontrolled.  History consistent with upper respiratory infection, likely viral.  Influenza test was performed which came back negative.  Patient counseled on supportive management of his symptoms including staying well-hydrated, frequent saline nasal irrigation, and over-the-counter decongestant/cough medicine as needed.  Follow-up if no improvement in symptoms by the 10-day kevin of illness.  - POCT Influenza A/B      Followup: Return if symptoms worsen or fail to improve.    Virginia Kern P.A.-C.

## 2018-10-29 NOTE — LETTER
October 29, 2018         Patient: Romero Baugh   YOB: 1985   Date of Visit: 10/29/2018           To Whom it May Concern:    Romero Baugh was seen in my clinic on 10/29/2018. Please excuse his absence from work on 10/29/2018 and 10/30/2018. Thank you.    If you have any questions or concerns, please don't hesitate to call.        Sincerely,           Virginia Kern P.A.-C.  Electronically Signed

## 2019-08-13 ENCOUNTER — OFFICE VISIT (OUTPATIENT)
Dept: URGENT CARE | Facility: PHYSICIAN GROUP | Age: 34
End: 2019-08-13
Payer: OTHER GOVERNMENT

## 2019-08-13 VITALS
WEIGHT: 169 LBS | SYSTOLIC BLOOD PRESSURE: 124 MMHG | TEMPERATURE: 98.6 F | OXYGEN SATURATION: 96 % | HEART RATE: 74 BPM | HEIGHT: 72 IN | BODY MASS INDEX: 22.89 KG/M2 | DIASTOLIC BLOOD PRESSURE: 78 MMHG | RESPIRATION RATE: 16 BRPM

## 2019-08-13 DIAGNOSIS — R11.0 NAUSEA: ICD-10-CM

## 2019-08-13 PROCEDURE — 99214 OFFICE O/P EST MOD 30 MIN: CPT | Performed by: PHYSICIAN ASSISTANT

## 2019-08-13 RX ORDER — ONDANSETRON 4 MG/1
4 TABLET, FILM COATED ORAL EVERY 6 HOURS PRN
Qty: 20 TAB | Refills: 1 | Status: SHIPPED | OUTPATIENT
Start: 2019-08-13 | End: 2019-09-11

## 2019-08-13 RX ORDER — ONDANSETRON 4 MG/1
4 TABLET, ORALLY DISINTEGRATING ORAL ONCE
Status: COMPLETED | OUTPATIENT
Start: 2019-08-13 | End: 2019-08-13

## 2019-08-13 RX ADMIN — ONDANSETRON 4 MG: 4 TABLET, ORALLY DISINTEGRATING ORAL at 10:10

## 2019-08-13 ASSESSMENT — ENCOUNTER SYMPTOMS
DIARRHEA: 1
FEVER: 0
NAUSEA: 1
CONSTIPATION: 0
BLOOD IN STOOL: 0
FOCAL WEAKNESS: 0
CHILLS: 0
HEADACHES: 1
SORE THROAT: 1
VOMITING: 0
COUGH: 1
LOSS OF CONSCIOUSNESS: 0
SPUTUM PRODUCTION: 0
FLANK PAIN: 0
SENSORY CHANGE: 0
SHORTNESS OF BREATH: 0
WHEEZING: 0
ABDOMINAL PAIN: 0
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
## 2019-08-13 NOTE — LETTER
August 13, 2019       Patient: Romero Baugh   YOB: 1985   Date of Visit: 8/13/2019         To Whom It May Concern:    It is my medical opinion that Romero Baugh should be excused from work for today and tomorrow due to illness; he is permitted to return to work without concern thereafter (on Thursday of this week).       If you have any questions or concerns, please don't hesitate to call [phone]          Sincerely,          Pilo Claire P.A.-C.  Electronically Signed

## 2019-09-09 ENCOUNTER — APPOINTMENT (OUTPATIENT)
Dept: RADIOLOGY | Facility: MEDICAL CENTER | Age: 34
End: 2019-09-09
Attending: EMERGENCY MEDICINE
Payer: OTHER GOVERNMENT

## 2019-09-09 ENCOUNTER — HOSPITAL ENCOUNTER (EMERGENCY)
Facility: MEDICAL CENTER | Age: 34
End: 2019-09-09
Attending: EMERGENCY MEDICINE
Payer: OTHER GOVERNMENT

## 2019-09-09 VITALS
BODY MASS INDEX: 25.18 KG/M2 | HEART RATE: 88 BPM | OXYGEN SATURATION: 97 % | TEMPERATURE: 97.2 F | RESPIRATION RATE: 16 BRPM | DIASTOLIC BLOOD PRESSURE: 74 MMHG | SYSTOLIC BLOOD PRESSURE: 117 MMHG | WEIGHT: 190 LBS | HEIGHT: 73 IN

## 2019-09-09 DIAGNOSIS — N20.1 URETEROLITHIASIS: ICD-10-CM

## 2019-09-09 DIAGNOSIS — R10.9 FLANK PAIN: ICD-10-CM

## 2019-09-09 LAB
ANION GAP SERPL CALC-SCNC: 10 MMOL/L (ref 0–11.9)
APPEARANCE UR: CLEAR
BACTERIA #/AREA URNS HPF: NEGATIVE /HPF
BASOPHILS # BLD AUTO: 0.4 % (ref 0–1.8)
BASOPHILS # BLD: 0.03 K/UL (ref 0–0.12)
BILIRUB UR QL STRIP.AUTO: NEGATIVE
BUN SERPL-MCNC: 18 MG/DL (ref 8–22)
CALCIUM SERPL-MCNC: 8.6 MG/DL (ref 8.5–10.5)
CHLORIDE SERPL-SCNC: 109 MMOL/L (ref 96–112)
CO2 SERPL-SCNC: 23 MMOL/L (ref 20–33)
COLOR UR: ABNORMAL
CREAT SERPL-MCNC: 1.26 MG/DL (ref 0.5–1.4)
EOSINOPHIL # BLD AUTO: 0.04 K/UL (ref 0–0.51)
EOSINOPHIL NFR BLD: 0.5 % (ref 0–6.9)
EPI CELLS #/AREA URNS HPF: ABNORMAL /HPF
ERYTHROCYTE [DISTWIDTH] IN BLOOD BY AUTOMATED COUNT: 38.9 FL (ref 35.9–50)
GLUCOSE SERPL-MCNC: 127 MG/DL (ref 65–99)
GLUCOSE UR STRIP.AUTO-MCNC: NEGATIVE MG/DL
HCT VFR BLD AUTO: 43.2 % (ref 42–52)
HGB BLD-MCNC: 14.9 G/DL (ref 14–18)
HYALINE CASTS #/AREA URNS LPF: ABNORMAL /LPF
IMM GRANULOCYTES # BLD AUTO: 0.03 K/UL (ref 0–0.11)
IMM GRANULOCYTES NFR BLD AUTO: 0.4 % (ref 0–0.9)
KETONES UR STRIP.AUTO-MCNC: ABNORMAL MG/DL
LEUKOCYTE ESTERASE UR QL STRIP.AUTO: ABNORMAL
LYMPHOCYTES # BLD AUTO: 0.99 K/UL (ref 1–4.8)
LYMPHOCYTES NFR BLD: 13.5 % (ref 22–41)
MCH RBC QN AUTO: 30.6 PG (ref 27–33)
MCHC RBC AUTO-ENTMCNC: 34.5 G/DL (ref 33.7–35.3)
MCV RBC AUTO: 88.7 FL (ref 81.4–97.8)
MICRO URNS: ABNORMAL
MONOCYTES # BLD AUTO: 0.48 K/UL (ref 0–0.85)
MONOCYTES NFR BLD AUTO: 6.6 % (ref 0–13.4)
NEUTROPHILS # BLD AUTO: 5.74 K/UL (ref 1.82–7.42)
NEUTROPHILS NFR BLD: 78.6 % (ref 44–72)
NITRITE UR QL STRIP.AUTO: NEGATIVE
NRBC # BLD AUTO: 0 K/UL
NRBC BLD-RTO: 0 /100 WBC
PH UR STRIP.AUTO: 5.5 [PH] (ref 5–8)
PLATELET # BLD AUTO: 173 K/UL (ref 164–446)
PMV BLD AUTO: 9.5 FL (ref 9–12.9)
POTASSIUM SERPL-SCNC: 3.9 MMOL/L (ref 3.6–5.5)
PROT UR QL STRIP: NEGATIVE MG/DL
RBC # BLD AUTO: 4.87 M/UL (ref 4.7–6.1)
RBC # URNS HPF: ABNORMAL /HPF
RBC UR QL AUTO: NEGATIVE
SODIUM SERPL-SCNC: 142 MMOL/L (ref 135–145)
SP GR UR STRIP.AUTO: 1.03
UROBILINOGEN UR STRIP.AUTO-MCNC: 1 MG/DL
WBC # BLD AUTO: 7.3 K/UL (ref 4.8–10.8)
WBC #/AREA URNS HPF: ABNORMAL /HPF

## 2019-09-09 PROCEDURE — 87086 URINE CULTURE/COLONY COUNT: CPT

## 2019-09-09 PROCEDURE — 85025 COMPLETE CBC W/AUTO DIFF WBC: CPT

## 2019-09-09 PROCEDURE — 700111 HCHG RX REV CODE 636 W/ 250 OVERRIDE (IP): Performed by: EMERGENCY MEDICINE

## 2019-09-09 PROCEDURE — 96375 TX/PRO/DX INJ NEW DRUG ADDON: CPT

## 2019-09-09 PROCEDURE — A9270 NON-COVERED ITEM OR SERVICE: HCPCS | Performed by: EMERGENCY MEDICINE

## 2019-09-09 PROCEDURE — 700102 HCHG RX REV CODE 250 W/ 637 OVERRIDE(OP): Performed by: EMERGENCY MEDICINE

## 2019-09-09 PROCEDURE — 700105 HCHG RX REV CODE 258: Performed by: EMERGENCY MEDICINE

## 2019-09-09 PROCEDURE — 80048 BASIC METABOLIC PNL TOTAL CA: CPT

## 2019-09-09 PROCEDURE — 81001 URINALYSIS AUTO W/SCOPE: CPT

## 2019-09-09 PROCEDURE — 96376 TX/PRO/DX INJ SAME DRUG ADON: CPT

## 2019-09-09 PROCEDURE — 74176 CT ABD & PELVIS W/O CONTRAST: CPT

## 2019-09-09 PROCEDURE — 99285 EMERGENCY DEPT VISIT HI MDM: CPT

## 2019-09-09 PROCEDURE — 96374 THER/PROPH/DIAG INJ IV PUSH: CPT

## 2019-09-09 RX ORDER — TAMSULOSIN HYDROCHLORIDE 0.4 MG/1
0.4 CAPSULE ORAL ONCE
Status: COMPLETED | OUTPATIENT
Start: 2019-09-09 | End: 2019-09-09

## 2019-09-09 RX ORDER — OXYCODONE HYDROCHLORIDE AND ACETAMINOPHEN 5; 325 MG/1; MG/1
1-2 TABLET ORAL EVERY 4 HOURS PRN
Qty: 20 TAB | Refills: 0 | Status: SHIPPED | OUTPATIENT
Start: 2019-09-09 | End: 2019-09-14

## 2019-09-09 RX ORDER — SODIUM CHLORIDE 9 MG/ML
1000 INJECTION, SOLUTION INTRAVENOUS ONCE
Status: COMPLETED | OUTPATIENT
Start: 2019-09-09 | End: 2019-09-09

## 2019-09-09 RX ORDER — ONDANSETRON 2 MG/ML
4 INJECTION INTRAMUSCULAR; INTRAVENOUS ONCE
Status: COMPLETED | OUTPATIENT
Start: 2019-09-09 | End: 2019-09-09

## 2019-09-09 RX ORDER — HYDROMORPHONE HYDROCHLORIDE 1 MG/ML
1 INJECTION, SOLUTION INTRAMUSCULAR; INTRAVENOUS; SUBCUTANEOUS ONCE
Status: COMPLETED | OUTPATIENT
Start: 2019-09-09 | End: 2019-09-09

## 2019-09-09 RX ORDER — KETOROLAC TROMETHAMINE 30 MG/ML
30 INJECTION, SOLUTION INTRAMUSCULAR; INTRAVENOUS ONCE
Status: COMPLETED | OUTPATIENT
Start: 2019-09-09 | End: 2019-09-09

## 2019-09-09 RX ORDER — TAMSULOSIN HYDROCHLORIDE 0.4 MG/1
0.4 CAPSULE ORAL
Qty: 7 CAP | Refills: 1 | Status: SHIPPED | OUTPATIENT
Start: 2019-09-09 | End: 2020-03-10

## 2019-09-09 RX ADMIN — HYDROMORPHONE HYDROCHLORIDE 1 MG: 1 INJECTION, SOLUTION INTRAMUSCULAR; INTRAVENOUS; SUBCUTANEOUS at 05:12

## 2019-09-09 RX ADMIN — HYDROMORPHONE HYDROCHLORIDE 1 MG: 1 INJECTION, SOLUTION INTRAMUSCULAR; INTRAVENOUS; SUBCUTANEOUS at 06:03

## 2019-09-09 RX ADMIN — KETOROLAC TROMETHAMINE 30 MG: 30 INJECTION, SOLUTION INTRAMUSCULAR at 05:17

## 2019-09-09 RX ADMIN — ONDANSETRON 4 MG: 2 INJECTION INTRAMUSCULAR; INTRAVENOUS at 05:12

## 2019-09-09 RX ADMIN — SODIUM CHLORIDE 1000 ML: 9 INJECTION, SOLUTION INTRAVENOUS at 05:17

## 2019-09-09 RX ADMIN — TAMSULOSIN HYDROCHLORIDE 0.4 MG: 0.4 CAPSULE ORAL at 07:57

## 2019-09-09 ASSESSMENT — LIFESTYLE VARIABLES
TOTAL SCORE: 0
HAVE YOU EVER FELT YOU SHOULD CUT DOWN ON YOUR DRINKING: NO
EVER FELT BAD OR GUILTY ABOUT YOUR DRINKING: NO
TOTAL SCORE: 0
HAVE PEOPLE ANNOYED YOU BY CRITICIZING YOUR DRINKING: NO
CONSUMPTION TOTAL: INCOMPLETE
DOES PATIENT WANT TO STOP DRINKING: NO
TOTAL SCORE: 0
DO YOU DRINK ALCOHOL: NO
EVER HAD A DRINK FIRST THING IN THE MORNING TO STEADY YOUR NERVES TO GET RID OF A HANGOVER: NO

## 2019-09-09 NOTE — LETTER
Emergency Services     September 9, 2019    Patient: Romero Baugh   YOB: 1985   Date of Visit: 9/9/2019       To Whom It May Concern:    Romero Baugh was seen and treated in our emergency department on 9/9/2019. He may return to work on 9/12/19.    Sincerely,     GARETT LIMON M.D.  [facility], EMERGENCY DEPT  Dept: [phone]

## 2019-09-09 NOTE — ED PROVIDER NOTES
ED Provider Note    Scribed for Jose Coulter M.D. by Rosalia Leigh. 9/9/2019  5:04 AM    Primary care provider: SHILO Hdz  Means of arrival: Walk-In  History obtained from: Patient  History limited by: None     CHIEF COMPLAINT  Chief Complaint   Patient presents with   • RLQ Pain     started around an hour ago, feels like something is being pinched really hard   • Nausea       HPI  Romero Baugh is a 34 y.o. male who presents to the Emergency Department for acute right flank pain onset approximately 4 am this morning. The patient states he was not experiencing any symptoms yesterday. He denies any alleviating or exacerbating factors. He denies vomiting or fever and states he has no medical problems.     REVIEW OF SYSTEMS  Pertinent positives include right flank pain.   Pertinent negatives include no vomiting or fever.    All other systems reviewed and negative. See HPI for further details.      PAST MEDICAL HISTORY   None noted when reviewed.     SURGICAL HISTORY   has a past surgical history that includes septorhinoplasty (2009).    SOCIAL HISTORY  Social History     Tobacco Use   • Smoking status: Former Smoker     Packs/day: 1.00     Years: 15.00     Pack years: 15.00     Types: Cigarettes     Last attempt to quit: 3/1/2016     Years since quitting: 3.5   • Smokeless tobacco: Former User   Substance Use Topics   • Alcohol use: No   • Drug use: No      Social History     Substance and Sexual Activity   Drug Use No       FAMILY HISTORY  Family History   Problem Relation Age of Onset   • Heart Disease Mother         valve replacment   • Hypertension Father    • Stroke Father    • Alcohol/Drug Father        CURRENT MEDICATIONS  Home Medications     Reviewed by Ethan Powell R.N. (Registered Nurse) on 09/09/19 at 0457  Med List Status: <None>   Medication Last Dose Status   asa/apap/caffeine (EXCEDRIN) 250-250-65 MG Tab  Active   fluticasone (FLONASE) 50 MCG/ACT nasal spray  Active  "  ibuprofen (MOTRIN) 200 MG Tab  Active   ondansetron (ZOFRAN) 4 MG Tab tablet  Active   Pseudoeph-Doxylamine-DM-APAP (DAYQUIL/NYQUIL COLD/FLU RELIEF PO)  Active                ALLERGIES  Allergies   Allergen Reactions   • Iodine    • Shellfish Allergy        PHYSICAL EXAM  VITAL SIGNS: /60   Pulse 62   Temp 36.2 °C (97.2 °F) (Temporal)   Resp 18   Ht 1.854 m (6' 1\")   Wt 86.2 kg (190 lb)   SpO2 97%   BMI 25.07 kg/m²     Nursing note and vitals reviewed.  Constitutional: Well-developed and well-nourished. Severe distress secondary to pain.   HENT: Head is normocephalic and atraumatic. Oropharynx is clear and moist without exudate or erythema.   Eyes: Pupils are equal, round, and reactive to light. Conjunctiva are normal.   Cardiovascular: Normal rate and regular rhythm. No murmur heard. Normal radial pulses.  Pulmonary/Chest: Breath sounds normal. No wheezes or rales.   Abdominal: No rebound or guarding, Soft and non-tender. No distention    Musculoskeletal: Tenderness in right posterior flank, Extremities exhibit normal range of motion without edema.   Neurological: Awake, alert and oriented to person, place, and time. No focal deficits noted.  Skin: Skin is warm and dry. No rash.   Psychiatric: Normal mood and affect. Appropriate for clinical situation.       DIAGNOSTIC STUDIES / PROCEDURES  LABS  Results for orders placed or performed during the hospital encounter of 09/09/19   URINALYSIS,CULTURE IF INDICATED   Result Value Ref Range    Color DK Yellow     Character Clear     Specific Gravity 1.032 <1.035    Ph 5.5 5.0 - 8.0    Glucose Negative Negative mg/dL    Ketones Trace (A) Negative mg/dL    Protein Negative Negative mg/dL    Bilirubin Negative Negative    Urobilinogen, Urine 1.0 Negative    Nitrite Negative Negative    Leukocyte Esterase Trace (A) Negative    Occult Blood Negative Negative    Micro Urine Req Microscopic    URINE MICROSCOPIC (W/UA)   Result Value Ref Range    WBC 10-20 (A) /hpf "    RBC 2-5 (A) /hpf    Bacteria Negative None /hpf    Epithelial Cells Few /hpf    Hyaline Cast 11-20 (A) /lpf   CBC WITH DIFFERENTIAL   Result Value Ref Range    WBC 7.3 4.8 - 10.8 K/uL    RBC 4.87 4.70 - 6.10 M/uL    Hemoglobin 14.9 14.0 - 18.0 g/dL    Hematocrit 43.2 42.0 - 52.0 %    MCV 88.7 81.4 - 97.8 fL    MCH 30.6 27.0 - 33.0 pg    MCHC 34.5 33.7 - 35.3 g/dL    RDW 38.9 35.9 - 50.0 fL    Platelet Count 173 164 - 446 K/uL    MPV 9.5 9.0 - 12.9 fL    Neutrophils-Polys 78.60 (H) 44.00 - 72.00 %    Lymphocytes 13.50 (L) 22.00 - 41.00 %    Monocytes 6.60 0.00 - 13.40 %    Eosinophils 0.50 0.00 - 6.90 %    Basophils 0.40 0.00 - 1.80 %    Immature Granulocytes 0.40 0.00 - 0.90 %    Nucleated RBC 0.00 /100 WBC    Neutrophils (Absolute) 5.74 1.82 - 7.42 K/uL    Lymphs (Absolute) 0.99 (L) 1.00 - 4.80 K/uL    Monos (Absolute) 0.48 0.00 - 0.85 K/uL    Eos (Absolute) 0.04 0.00 - 0.51 K/uL    Baso (Absolute) 0.03 0.00 - 0.12 K/uL    Immature Granulocytes (abs) 0.03 0.00 - 0.11 K/uL    NRBC (Absolute) 0.00 K/uL   BMP   Result Value Ref Range    Sodium 142 135 - 145 mmol/L    Potassium 3.9 3.6 - 5.5 mmol/L    Chloride 109 96 - 112 mmol/L    Co2 23 20 - 33 mmol/L    Glucose 127 (H) 65 - 99 mg/dL    Bun 18 8 - 22 mg/dL    Creatinine 1.26 0.50 - 1.40 mg/dL    Calcium 8.6 8.5 - 10.5 mg/dL    Anion Gap 10.0 0.0 - 11.9   ESTIMATED GFR   Result Value Ref Range    GFR If African American >60 >60 mL/min/1.73 m 2    GFR If Non African American >60 >60 mL/min/1.73 m 2     All labs reviewed by me.    RADIOLOGY  CT-RENAL COLIC EVALUATION(A/P W/O)   Final Result      2 mm distal RIGHT ureterolith with mild RIGHT hydronephrosis           The radiologist's interpretation of all radiological studies have been reviewed by me.    COURSE & MEDICAL DECISION MAKING  Nursing notes, VS, PMSFHx reviewed in chart.     Review of past medical records shows the patient was last seen at the ED in 2017 for influenza symptoms.       5:04 AM - Patient seen  and examined at bedside. Patient will be treated with NS infusion 1,000 mL, dilaudid 1 mg injection, zofran 4 mg injection, and toradol 30 mg injection. Intravenous fluids were administered for treatment of kidney stone. Ordered CT-renal colic evaluation to evaluate his symptoms. The differential diagnoses include but are not limited to: kidney stone      6:15 AM - Reviewed radiology results which indicated a 2 mm distal right ureterolith with mild right hydronephrosis.      8:41 AM - Reviewed lab results at this time, which indicated UA is unremarkable for 10-20 whites, nitrite negative, no bacteria, afebrile, and WBC count is 7.3.     8:44 AM - Patient was reevaluated at bedside. Discussed lab and radiology results with the patient and informed them of the results as noted above. Discussed discharging patient home. Advised patient to return for fever, vomiting, or new or worsening symptoms. Patient verbalizes understanding and agreement to this plan of care.      HYDRATION: Based on the patient's presentation of Other treatment of kidney stones the patient was given IV fluids. IV Hydration was used because oral hydration was not adequate alone. Upon recheck following hydration, the patient was inproved. .     I reviewed prescription monitoring program for patient's narcotic use before prescribing a scheduled drug.The patient will not drink alcohol nor drive with prescribed medications. The patient will return for new or worsening symptoms and is stable at the time of discharge.    In prescribing controlled substances to this patient, I certify that I have obtained and reviewed the medical history of Romero Baugh. I have also made a good todd effort to obtain applicable records from other providers who have treated the patient and records did not demonstrate any increased risk of substance abuse that would prevent me from prescribing controlled substances.     I have conducted a physical exam and documented it.  I have reviewed Mr. Baugh’s prescription history as maintained by the [place] Prescription Monitoring Program.     I have assessed the patient’s risk for abuse, dependency, and addiction using the validated Opioid Risk Tool available at https://www.mdcalc.com/jhskot-fmpc-abex-ort-narcotic-abuse.     Given the above, I believe the benefits of controlled substance therapy outweigh the risks. The reasons for prescribing controlled substances include non-narcotic, oral analgesic alternatives have been inadequate for pain control. Accordingly, I have discussed the risk and benefits, treatment plan, and alternative therapies with the patient.     DISPOSITION:  Patient will be discharged home in stable condition.    FOLLOW UP:  [facility], Emergency Dept  [address] [facility] [address] [phone]    If symptoms worsen. Or for fever, chills, vomiting, or other concerns.    OUTPATIENT MEDICATIONS:  New Prescriptions    OXYCODONE-ACETAMINOPHEN (PERCOCET) 5-325 MG TAB    Take 1-2 Tabs by mouth every four hours as needed for up to 5 days.    TAMSULOSIN (FLOMAX) 0.4 MG CAPSULE    Take 1 Cap by mouth ONE-HALF HOUR AFTER BREAKFAST. Take daily until stone passes.       FINAL IMPRESSION  1. Flank pain    2. Ureterolithiasis          IRosalia (Sara), am scribing for, and in the presence of, Jose Coulter M.D..    Electronically signed by: Rosalia Garvey), 9/9/2019    IJose M.D. personally performed the services described in this documentation, as scribed by Rosalia Leigh in my presence, and it is both accurate and complete. C.    The note accurately reflects work and decisions made by me.  Jose Coulter  9/9/2019  11:03 AM

## 2019-09-09 NOTE — ED NOTES
Patient returned from CT scan at this time.    Patient awake alert and oriented x 4, Glascow 15, bed in low position, call light within reach, on room air, attached to cardiac monitor, unlabored breathing noted, no cough noted, interacts with staff, interactions noted as appropriate, wife and family at bedside.

## 2019-09-09 NOTE — ED TRIAGE NOTES
"Romero Baugh   34 y.o. male   Chief Complaint   Patient presents with   • RLQ Pain     started around an hour ago, feels like something is being pinched really hard   • Nausea      Pt to triage in WC for above complaint. Pt in obvious discomfort in triage, still has his appendix.      Charge RN aware of patient. To blue pod    BP (!) 163/95   Pulse 92   Temp 36.2 °C (97.2 °F) (Temporal)   Resp (!) 24   Ht 1.854 m (6' 1\")   Wt 86.2 kg (190 lb)   SpO2 97%   BMI 25.07 kg/m²     "

## 2019-09-09 NOTE — ED NOTES
Patient awake alert and oriented x 4, Glascow 15, bed in low position, call light within reach, on room air, attached to cardiac monitor, unlabored breathing noted, no cough noted, interacts with staff, interactions noted as appropriate, complains of right sided abdominal pain but states it has improved.

## 2019-09-11 ENCOUNTER — HOSPITAL ENCOUNTER (OUTPATIENT)
Dept: LAB | Facility: MEDICAL CENTER | Age: 34
End: 2019-09-11
Attending: NURSE PRACTITIONER
Payer: OTHER GOVERNMENT

## 2019-09-11 ENCOUNTER — OFFICE VISIT (OUTPATIENT)
Dept: MEDICAL GROUP | Facility: PHYSICIAN GROUP | Age: 34
End: 2019-09-11
Payer: OTHER GOVERNMENT

## 2019-09-11 VITALS
RESPIRATION RATE: 14 BRPM | HEART RATE: 85 BPM | WEIGHT: 190 LBS | SYSTOLIC BLOOD PRESSURE: 134 MMHG | OXYGEN SATURATION: 99 % | HEIGHT: 73 IN | BODY MASS INDEX: 25.18 KG/M2 | DIASTOLIC BLOOD PRESSURE: 82 MMHG | TEMPERATURE: 98.3 F

## 2019-09-11 DIAGNOSIS — Z87.442 HISTORY OF KIDNEY STONES: ICD-10-CM

## 2019-09-11 DIAGNOSIS — R53.82 CHRONIC FATIGUE: ICD-10-CM

## 2019-09-11 DIAGNOSIS — R30.0 DYSURIA: ICD-10-CM

## 2019-09-11 DIAGNOSIS — R06.83 SNORING: ICD-10-CM

## 2019-09-11 LAB
25(OH)D3 SERPL-MCNC: 26 NG/ML (ref 30–100)
BACTERIA UR CULT: ABNORMAL
BACTERIA UR CULT: ABNORMAL
SIGNIFICANT IND 70042: ABNORMAL
SITE SITE: ABNORMAL
SOURCE SOURCE: ABNORMAL
TESTOST SERPL-MCNC: 365 NG/DL (ref 175–781)
THYROPEROXIDASE AB SERPL-ACNC: 3.7 IU/ML (ref 0–9)
TSH SERPL DL<=0.005 MIU/L-ACNC: 5.68 UIU/ML (ref 0.38–5.33)

## 2019-09-11 PROCEDURE — 36415 COLL VENOUS BLD VENIPUNCTURE: CPT

## 2019-09-11 PROCEDURE — 82306 VITAMIN D 25 HYDROXY: CPT

## 2019-09-11 PROCEDURE — 84443 ASSAY THYROID STIM HORMONE: CPT

## 2019-09-11 PROCEDURE — 99214 OFFICE O/P EST MOD 30 MIN: CPT | Performed by: NURSE PRACTITIONER

## 2019-09-11 PROCEDURE — 86376 MICROSOMAL ANTIBODY EACH: CPT

## 2019-09-11 PROCEDURE — 84403 ASSAY OF TOTAL TESTOSTERONE: CPT

## 2019-09-11 RX ORDER — CIPROFLOXACIN 500 MG/1
500 TABLET, FILM COATED ORAL 2 TIMES DAILY
Qty: 20 TAB | Refills: 0 | Status: SHIPPED | OUTPATIENT
Start: 2019-09-11 | End: 2020-03-10

## 2019-09-11 ASSESSMENT — PATIENT HEALTH QUESTIONNAIRE - PHQ9: CLINICAL INTERPRETATION OF PHQ2 SCORE: 0

## 2019-09-11 NOTE — ASSESSMENT & PLAN NOTE
Burning sensation with urination.  Recent ER visit and urinalysis culture + for strep in urine.  No fever reported, pain in urethra reported.

## 2019-09-11 NOTE — ASSESSMENT & PLAN NOTE
Reports decrease in energy levels.  Has found that he cannot run as fast as he used to.  This has been going on for a couple of years.  Reports 4-6 hours of sleep at night.  Reports some increased anxiety.  Has decreased caffeine intake recently. Reports ongoing snoring.

## 2019-09-11 NOTE — PROGRESS NOTES
"Chief Complaint   Patient presents with   • Referral Needed     URGENT to urology   • Orders Needed     labs    • Other     physical abilty  decrease        Subjective:   Romero Baugh is a 34 y.o. male here today for evaluation and management of:    Dysuria  Burning sensation with urination.  Recent ER visit and urinalysis culture + for strep in urine.  No fever reported, pain in urethra reported.     Chronic fatigue  Reports decrease in energy levels.  Has found that he cannot run as fast as he used to.  This has been going on for a couple of years.  Reports 4-6 hours of sleep at night.  Reports some increased anxiety.  Has decreased caffeine intake recently. Reports ongoing snoring.      Snoring  Reports his wife reports that he is snoring nightly.            Current medicines (including changes today)  Current Outpatient Medications   Medication Sig Dispense Refill   • ciprofloxacin (CIPRO) 500 MG Tab Take 1 Tab by mouth 2 times a day. 20 Tab 0   • tamsulosin (FLOMAX) 0.4 MG capsule Take 1 Cap by mouth ONE-HALF HOUR AFTER BREAKFAST. Take daily until stone passes. 7 Cap 1   • oxyCODONE-acetaminophen (PERCOCET) 5-325 MG Tab Take 1-2 Tabs by mouth every four hours as needed for up to 5 days. 20 Tab 0   • ibuprofen (MOTRIN) 200 MG Tab Take 200 mg by mouth every 6 hours as needed.     • asa/apap/caffeine (EXCEDRIN) 250-250-65 MG Tab Take 1 Tab by mouth every 6 hours as needed for Headache.     • fluticasone (FLONASE) 50 MCG/ACT nasal spray Spray 1 Spray in nose every day.       No current facility-administered medications for this visit.      He  has no past medical history on file.    ROS as stated in hpi  No chest pain, no shortness of breath, no abdominal pain + urinary pain       Objective:     /82 (BP Location: Left arm, Patient Position: Sitting)   Pulse 85   Temp 36.8 °C (98.3 °F) (Temporal)   Resp 14   Ht 1.854 m (6' 1\")   Wt 86.2 kg (190 lb)   SpO2 99%  Body mass index is 25.07 kg/m². " afebrile  Physical Exam:  Constitutional: Alert, no distress.  Skin: Warm, dry, good turgor,no cyanosis, no rashes in visible areas.  Eye: Equal, round and reactive, conjunctiva clear, lids normal.  Ears: No tenderness, no discharge.  External canals are without any significant edema or erythema.  Gross auditory acuity is intact.  Nose: symmetrical without tenderness, no discharge.  Mouth/Throat: lips without lesion.  Oropharynx clear.  Neck: Trachea midline, no masses, no obvious thyroid enlargement Range of motion within normal limits.  Neuro: Cranial nerves 2-12 grossly intact.  No sensory deficit.  Respiratory: Unlabored respiratory effort,  Cardiovascular: Normal S1, S2, no murmur, no edema.  Abdomen: Soft, non-tender, no masses, no guarding,  no hepatosplenomegaly. No CVA tenderness  Psych: Alert and oriented x3, normal affect and mood and judgement.        Assessment and Plan:   The following treatment plan was discussed    1. History of kidney stones  This is a new problem to me.  Acute, ER visit.  Referral placed for follow up to urology.  Monitor.   - REFERRAL TO UROLOGY    2. Chronic fatigue  This is a new problem to me.  Chronic, ongoing.  May represents metabolic issue or sleep apnea.  Labs ordered.  Sleep study ordered.  Monitor and follow.   - TSH; Future  - VITAMIN D,25 HYDROXY; Future  - TESTOSTERONE SERUM; Future  - THYROID PEROXIDASE  (TPO) AB; Future  - TESTOSTERONE SERUM; Future    3. Dysuria  This is a new problem to me.  Acute finding on recent urine culture.  cipro 500 mg X10 days. AZO recommended for pain.  Monitor and follow.     4. Snoring  This is a new problem to me.  Chronic, ongoing, may represent DAVID.  Sleep study referral placed.  Monitor and follow.   - REFERRAL TO SLEEP STUDIES      Followup: Return if symptoms worsen or fail to improve.         Educated in proper administration of medication(s) ordered today including safety, possible SE, risks, benefits, rationale and  alternatives to therapy.     Please note that this dictation was created using voice recognition software. I have made every reasonable attempt to correct obvious errors, but I expect that there are errors of grammar and possibly content that I did not discover before finalizing the note.

## 2019-09-11 NOTE — ASSESSMENT & PLAN NOTE
Cataract OU: Observe for now without intervention.  The patient was advised to contact us if any change or worsening of vision Reports his wife reports that he is snoring nightly.

## 2019-09-13 ENCOUNTER — TELEPHONE (OUTPATIENT)
Dept: MEDICAL GROUP | Facility: PHYSICIAN GROUP | Age: 34
End: 2019-09-13

## 2019-09-13 ENCOUNTER — HOSPITAL ENCOUNTER (OUTPATIENT)
Dept: LAB | Facility: MEDICAL CENTER | Age: 34
End: 2019-09-13
Attending: FAMILY MEDICINE
Payer: OTHER GOVERNMENT

## 2019-09-13 DIAGNOSIS — R53.82 CHRONIC FATIGUE: ICD-10-CM

## 2019-09-13 LAB — TESTOST SERPL-MCNC: 503 NG/DL (ref 175–781)

## 2019-09-13 PROCEDURE — 84403 ASSAY OF TOTAL TESTOSTERONE: CPT

## 2019-09-13 PROCEDURE — 36415 COLL VENOUS BLD VENIPUNCTURE: CPT

## 2019-09-14 NOTE — ED NOTES
ED Positive Culture Follow-up/Notification Note:    Date: 9/13/19     Patient seen in the ED on 9/9/2019 for acute right flank pain. CT renal colic - 2 mm distal right ureterolith with mild right hydronephrosis.  1. Flank pain    2. Ureterolithiasis       Discharge Medication List as of 9/9/2019  9:10 AM      START taking these medications    Details   tamsulosin (FLOMAX) 0.4 MG capsule Take 1 Cap by mouth ONE-HALF HOUR AFTER BREAKFAST. Take daily until stone passes., Disp-7 Cap, R-1, Print Rx Paper      oxyCODONE-acetaminophen (PERCOCET) 5-325 MG Tab Take 1-2 Tabs by mouth every four hours as needed for up to 5 days., Disp-20 Tab, R-0, Print Rx Paper, For 5 days             Allergies: Iodine and Shellfish allergy     Vitals:    09/09/19 0600 09/09/19 0615 09/09/19 0802 09/09/19 0915   BP: 123/77 127/73 120/60 117/74   Pulse: 76 66 62 88   Resp: 16 16 18 16   Temp:       TempSrc:       SpO2: 99% 97%     Weight:       Height:           Final cultures:   Results     Procedure Component Value Units Date/Time    URINE CULTURE(NEW) [[id number]]  (Abnormal) Collected:  09/09/19 0705    Order Status:  Completed Specimen:  Urine Updated:  09/11/19 0757     Significant Indicator POS     Source UR     Site -     Culture Result Mixed skin jay 10-50,000 cfu/mL      Viridans Streptococcus  ,000 cfu/mL      URINALYSIS,CULTURE IF INDICATED [[id number]]  (Abnormal) Collected:  09/09/19 0705    Order Status:  Completed Specimen:  Urine, Clean Catch Updated:  09/09/19 0736     Color DK Yellow     Character Clear     Specific Gravity 1.032     Ph 5.5     Glucose Negative mg/dL      Ketones Trace mg/dL      Protein Negative mg/dL      Bilirubin Negative     Urobilinogen, Urine 1.0     Nitrite Negative     Leukocyte Esterase Trace     Occult Blood Negative     Micro Urine Req Microscopic          Plan:   Viridans Strep in the urine is a common urogenital colonizer and not likely a true urinary pathogen.  No need to treat with  antimicrobials at this time.        Geovanna Prieto

## 2019-09-18 ENCOUNTER — PATIENT MESSAGE (OUTPATIENT)
Dept: MEDICAL GROUP | Facility: PHYSICIAN GROUP | Age: 34
End: 2019-09-18

## 2019-09-18 NOTE — TELEPHONE ENCOUNTER
From: Romero Baugh  To: SHILO Hdz  Sent: 9/18/2019 12:31 PM PDT  Subject: Test Result Question    yes, it was completed on Friday     ----- Message -----  From: SHILO Hdz  Sent: 9/18/19, 11:36  To: Romero Baugh  Subject: RE: Test Result Question    I have only seen the first testosterone. Did you just have the 2nd one done?  Let me know  SHILO Hdz      ----- Message -----   From: Romero Baugh   Sent: 9/18/2019 10:15 AM PDT   To: SHILO Hdz  Subject: Test Result Question    Mrs. Jaffe,    My lab results have come back and I have no idea what they mean or what to do next. Do we need to set up another appointment?

## 2019-10-18 ENCOUNTER — APPOINTMENT (OUTPATIENT)
Dept: RADIOLOGY | Facility: MEDICAL CENTER | Age: 34
End: 2019-10-18
Attending: UROLOGY
Payer: OTHER GOVERNMENT

## 2019-10-18 DIAGNOSIS — N20.1 CALCULUS OF URETER: ICD-10-CM

## 2019-10-18 PROCEDURE — 74176 CT ABD & PELVIS W/O CONTRAST: CPT

## 2019-12-10 ENCOUNTER — OFFICE VISIT (OUTPATIENT)
Dept: URGENT CARE | Facility: PHYSICIAN GROUP | Age: 34
End: 2019-12-10
Payer: OTHER GOVERNMENT

## 2019-12-10 VITALS
DIASTOLIC BLOOD PRESSURE: 92 MMHG | BODY MASS INDEX: 24.65 KG/M2 | SYSTOLIC BLOOD PRESSURE: 132 MMHG | TEMPERATURE: 97.5 F | RESPIRATION RATE: 16 BRPM | OXYGEN SATURATION: 100 % | WEIGHT: 186 LBS | HEART RATE: 76 BPM | HEIGHT: 73 IN

## 2019-12-10 DIAGNOSIS — H60.311 ACUTE DIFFUSE OTITIS EXTERNA OF RIGHT EAR: ICD-10-CM

## 2019-12-10 PROCEDURE — 99214 OFFICE O/P EST MOD 30 MIN: CPT | Performed by: PHYSICIAN ASSISTANT

## 2019-12-10 RX ORDER — AMOXICILLIN AND CLAVULANATE POTASSIUM 875; 125 MG/1; MG/1
1 TABLET, FILM COATED ORAL 2 TIMES DAILY
Qty: 14 TAB | Refills: 0 | Status: SHIPPED | OUTPATIENT
Start: 2019-12-10 | End: 2019-12-17

## 2019-12-10 RX ORDER — OFLOXACIN 3 MG/ML
5 SOLUTION AURICULAR (OTIC) DAILY
Qty: 10 ML | Refills: 0 | Status: SHIPPED | OUTPATIENT
Start: 2019-12-10 | End: 2019-12-17

## 2019-12-10 ASSESSMENT — ENCOUNTER SYMPTOMS
PALPITATIONS: 0
SORE THROAT: 0
ABDOMINAL PAIN: 0
VOMITING: 0
NAUSEA: 0
RHINORRHEA: 0
EYE PAIN: 0
SHORTNESS OF BREATH: 0
CHILLS: 0
MYALGIAS: 0
DIZZINESS: 0
CONSTIPATION: 0
COUGH: 0
DIARRHEA: 0
HEADACHES: 0
FEVER: 0
BLURRED VISION: 0

## 2019-12-10 NOTE — PROGRESS NOTES
Subjective:      Romero Baugh is a 34 y.o. male who presents with Otalgia (R ear [id number] )    Patient states that almost a month ago his right ear started to itch so he scratched it with his finger few times causing some abrasions.  He said that afterwards started noticed some buildup of wax or other discharge so he used a Q-tip to clean him.  He says is been gradually worsening since that time and now it is very painful even to the touch.  Otalgia    There is pain in the right ear. This is a new problem. The current episode started 1 to 4 weeks ago (Almost 1 month ago). The problem occurs constantly. The problem has been gradually worsening. There has been no fever. The pain is moderate. Associated symptoms include ear discharge and hearing loss (Muffled hearing in right ear). Pertinent negatives include no abdominal pain, coughing, diarrhea, headaches, rash, rhinorrhea, sore throat or vomiting. He has tried nothing for the symptoms.       Review of Systems   Constitutional: Negative for chills, fever and malaise/fatigue.   HENT: Positive for ear discharge, ear pain and hearing loss (Muffled hearing in right ear). Negative for congestion, rhinorrhea and sore throat.    Eyes: Negative for blurred vision and pain.   Respiratory: Negative for cough and shortness of breath.    Cardiovascular: Negative for chest pain and palpitations.   Gastrointestinal: Negative for abdominal pain, constipation, diarrhea, nausea and vomiting.   Musculoskeletal: Negative for myalgias.   Skin: Negative for rash.   Neurological: Negative for dizziness and headaches.       PMH:  has no past medical history on file.  MEDS:   Current Outpatient Medications:   •  amoxicillin-clavulanate (AUGMENTIN) 875-125 MG Tab, Take 1 Tab by mouth 2 times a day for 7 days., Disp: 14 Tab, Rfl: 0  •  ofloxacin otic sol (FLOXIN OTIC) 0.3 % Solution, Place 5 Drops in right ear every day for 7 days., Disp: 10 mL, Rfl: 0  •  ibuprofen (MOTRIN) 200 MG  "Tab, Take 200 mg by mouth every 6 hours as needed., Disp: , Rfl:   •  asa/apap/caffeine (EXCEDRIN) 250-250-65 MG Tab, Take 1 Tab by mouth every 6 hours as needed for Headache., Disp: , Rfl:   •  ciprofloxacin (CIPRO) 500 MG Tab, Take 1 Tab by mouth 2 times a day. (Patient not taking: Reported on 12/10/2019), Disp: 20 Tab, Rfl: 0  •  tamsulosin (FLOMAX) 0.4 MG capsule, Take 1 Cap by mouth ONE-HALF HOUR AFTER BREAKFAST. Take daily until stone passes. (Patient not taking: Reported on 12/10/2019), Disp: 7 Cap, Rfl: 1  •  fluticasone (FLONASE) 50 MCG/ACT nasal spray, Spray 1 Spray in nose every day., Disp: , Rfl:   ALLERGIES:   Allergies   Allergen Reactions   • Iodine    • Shellfish Allergy      SURGHX:   Past Surgical History:   Procedure Laterality Date   • SEPTORHINOPLASTY  2009     SOCHX:  reports that he quit smoking about 3 years ago. His smoking use included cigarettes. He has a 15.00 pack-year smoking history. He has quit using smokeless tobacco. He reports that he does not drink alcohol or use drugs.  FH: Family history was reviewed, no pertinent findings to report     Objective:     /92   Pulse 76   Temp 36.4 °C (97.5 °F) (Temporal)   Resp 16   Ht 1.854 m (6' 1\")   Wt 84.4 kg (186 lb)   SpO2 100%   BMI 24.54 kg/m²      Physical Exam  Constitutional:       Appearance: He is well-developed.   HENT:      Head: Normocephalic and atraumatic.      Right Ear: External ear normal. Drainage, swelling and tenderness present. No mastoid tenderness. Tympanic membrane is not erythematous.      Left Ear: Hearing, tympanic membrane, ear canal and external ear normal.      Ears:      Comments: Right ear canal is erythematous and edematous with white/yellow debris noted throughout  Eyes:      Conjunctiva/sclera: Conjunctivae normal.      Pupils: Pupils are equal, round, and reactive to light.   Neck:      Musculoskeletal: Normal range of motion.   Cardiovascular:      Rate and Rhythm: Normal rate and regular " rhythm.      Heart sounds: Normal heart sounds. No murmur.   Pulmonary:      Effort: Pulmonary effort is normal.      Breath sounds: Normal breath sounds. No wheezing.   Lymphadenopathy:      Cervical: No cervical adenopathy.   Skin:     General: Skin is warm and dry.      Capillary Refill: Capillary refill takes less than 2 seconds.   Neurological:      Mental Status: He is alert and oriented to person, place, and time.   Psychiatric:         Behavior: Behavior normal.         Judgment: Judgment normal.            Assessment/Plan:       1. Acute diffuse otitis externa of right ear  - amoxicillin-clavulanate (AUGMENTIN) 875-125 MG Tab; Take 1 Tab by mouth 2 times a day for 7 days.  Dispense: 14 Tab; Refill: 0  - ofloxacin otic sol (FLOXIN OTIC) 0.3 % Solution; Place 5 Drops in right ear every day for 7 days.  Dispense: 10 mL; Refill: 0  *Based on the fact the patient admits to scratching his ear canal and using a Q-tip will treat with oral antibiotics as well as antibiotic eardrops.          Differential Diagnosis, natural history, and supportive care discussed. Return to the Urgent Care or follow up with your PCP if symptoms fail to resolve, or for any new or worsening symptoms. Emergency room precautions discussed. Patient and/or family appears understanding of information.

## 2019-12-10 NOTE — LETTER
December 10, 2019         Patient: Romero Baugh   YOB: 1985   Date of Visit: 12/10/2019           To Whom it May Concern:    Romero Baugh was seen in my clinic on 12/10/2019. He may return to work on 12/11/2019.    If you have any questions or concerns, please don't hesitate to call.        Sincerely,           Aimee Soni P.A.-C.  Electronically Signed

## 2019-12-11 ENCOUNTER — APPOINTMENT (OUTPATIENT)
Dept: MEDICAL GROUP | Facility: PHYSICIAN GROUP | Age: 34
End: 2019-12-11
Payer: OTHER GOVERNMENT

## 2020-02-22 ENCOUNTER — PATIENT MESSAGE (OUTPATIENT)
Dept: MEDICAL GROUP | Facility: PHYSICIAN GROUP | Age: 35
End: 2020-02-22

## 2020-03-10 ENCOUNTER — HOSPITAL ENCOUNTER (OUTPATIENT)
Dept: RADIOLOGY | Facility: MEDICAL CENTER | Age: 35
End: 2020-03-10
Attending: PHYSICIAN ASSISTANT
Payer: OTHER GOVERNMENT

## 2020-03-10 ENCOUNTER — OFFICE VISIT (OUTPATIENT)
Dept: URGENT CARE | Facility: PHYSICIAN GROUP | Age: 35
End: 2020-03-10
Payer: OTHER GOVERNMENT

## 2020-03-10 ENCOUNTER — APPOINTMENT (OUTPATIENT)
Dept: RADIOLOGY | Facility: MEDICAL CENTER | Age: 35
End: 2020-03-10
Attending: EMERGENCY MEDICINE
Payer: OTHER GOVERNMENT

## 2020-03-10 ENCOUNTER — HOSPITAL ENCOUNTER (OUTPATIENT)
Facility: MEDICAL CENTER | Age: 35
End: 2020-03-11
Attending: EMERGENCY MEDICINE | Admitting: INTERNAL MEDICINE
Payer: OTHER GOVERNMENT

## 2020-03-10 ENCOUNTER — APPOINTMENT (OUTPATIENT)
Dept: CARDIOLOGY | Facility: MEDICAL CENTER | Age: 35
End: 2020-03-10
Attending: INTERNAL MEDICINE
Payer: OTHER GOVERNMENT

## 2020-03-10 VITALS
RESPIRATION RATE: 18 BRPM | WEIGHT: 180 LBS | BODY MASS INDEX: 23.86 KG/M2 | TEMPERATURE: 97.9 F | DIASTOLIC BLOOD PRESSURE: 90 MMHG | HEART RATE: 79 BPM | SYSTOLIC BLOOD PRESSURE: 112 MMHG | HEIGHT: 73 IN | OXYGEN SATURATION: 96 %

## 2020-03-10 DIAGNOSIS — R07.9 CHEST PAIN, UNSPECIFIED TYPE: ICD-10-CM

## 2020-03-10 DIAGNOSIS — R06.02 CHRONIC SHORTNESS OF BREATH: ICD-10-CM

## 2020-03-10 DIAGNOSIS — R06.03 ACUTE RESPIRATORY DISTRESS: ICD-10-CM

## 2020-03-10 DIAGNOSIS — R07.9 ACUTE CHEST PAIN: ICD-10-CM

## 2020-03-10 DIAGNOSIS — R06.02 SHORTNESS OF BREATH: ICD-10-CM

## 2020-03-10 LAB
ALBUMIN SERPL BCP-MCNC: 4.3 G/DL (ref 3.2–4.9)
ALBUMIN/GLOB SERPL: 1.5 G/DL
ALP SERPL-CCNC: 56 U/L (ref 30–99)
ALT SERPL-CCNC: 16 U/L (ref 2–50)
AMPHET UR QL SCN: NEGATIVE
ANION GAP SERPL CALC-SCNC: 8 MMOL/L (ref 0–11.9)
AST SERPL-CCNC: 17 U/L (ref 12–45)
BARBITURATES UR QL SCN: NEGATIVE
BASOPHILS # BLD AUTO: 0.6 % (ref 0–1.8)
BASOPHILS # BLD: 0.03 K/UL (ref 0–0.12)
BENZODIAZ UR QL SCN: NEGATIVE
BILIRUB SERPL-MCNC: 0.7 MG/DL (ref 0.1–1.5)
BUN SERPL-MCNC: 16 MG/DL (ref 8–22)
BZE UR QL SCN: NEGATIVE
CALCIUM SERPL-MCNC: 9.9 MG/DL (ref 8.5–10.5)
CANNABINOIDS UR QL SCN: NEGATIVE
CHLORIDE SERPL-SCNC: 104 MMOL/L (ref 96–112)
CHOLEST SERPL-MCNC: 182 MG/DL (ref 100–199)
CO2 SERPL-SCNC: 25 MMOL/L (ref 20–33)
CREAT SERPL-MCNC: 1.09 MG/DL (ref 0.5–1.4)
D DIMER PPP IA.FEU-MCNC: <0.4 UG/ML (FEU) (ref 0–0.5)
EKG IMPRESSION: NORMAL
EKG IMPRESSION: NORMAL
EOSINOPHIL # BLD AUTO: 0.11 K/UL (ref 0–0.51)
EOSINOPHIL NFR BLD: 2.1 % (ref 0–6.9)
ERYTHROCYTE [DISTWIDTH] IN BLOOD BY AUTOMATED COUNT: 39.5 FL (ref 35.9–50)
EST. AVERAGE GLUCOSE BLD GHB EST-MCNC: 111 MG/DL
GLOBULIN SER CALC-MCNC: 2.9 G/DL (ref 1.9–3.5)
GLUCOSE SERPL-MCNC: 91 MG/DL (ref 65–99)
HBA1C MFR BLD: 5.5 % (ref 0–5.6)
HCT VFR BLD AUTO: 50.1 % (ref 42–52)
HDLC SERPL-MCNC: 39 MG/DL
HGB BLD-MCNC: 16.8 G/DL (ref 14–18)
IMM GRANULOCYTES # BLD AUTO: 0.01 K/UL (ref 0–0.11)
IMM GRANULOCYTES NFR BLD AUTO: 0.2 % (ref 0–0.9)
LDLC SERPL CALC-MCNC: 124 MG/DL
LV EJECT FRACT  99904: 55
LV EJECT FRACT MOD 2C 99903: 65.71
LV EJECT FRACT MOD 4C 99902: 54.74
LV EJECT FRACT MOD BP 99901: 60.82
LYMPHOCYTES # BLD AUTO: 1.47 K/UL (ref 1–4.8)
LYMPHOCYTES NFR BLD: 27.9 % (ref 22–41)
MAGNESIUM SERPL-MCNC: 2.3 MG/DL (ref 1.5–2.5)
MCH RBC QN AUTO: 29.9 PG (ref 27–33)
MCHC RBC AUTO-ENTMCNC: 33.5 G/DL (ref 33.7–35.3)
MCV RBC AUTO: 89.1 FL (ref 81.4–97.8)
METHADONE UR QL SCN: NEGATIVE
MONOCYTES # BLD AUTO: 0.58 K/UL (ref 0–0.85)
MONOCYTES NFR BLD AUTO: 11 % (ref 0–13.4)
NEUTROPHILS # BLD AUTO: 3.06 K/UL (ref 1.82–7.42)
NEUTROPHILS NFR BLD: 58.2 % (ref 44–72)
NRBC # BLD AUTO: 0 K/UL
NRBC BLD-RTO: 0 /100 WBC
NT-PROBNP SERPL IA-MCNC: 24 PG/ML (ref 0–125)
OPIATES UR QL SCN: NEGATIVE
OXYCODONE UR QL SCN: NEGATIVE
PCP UR QL SCN: NEGATIVE
PLATELET # BLD AUTO: 207 K/UL (ref 164–446)
PMV BLD AUTO: 9.5 FL (ref 9–12.9)
POTASSIUM SERPL-SCNC: 4.2 MMOL/L (ref 3.6–5.5)
PROPOXYPH UR QL SCN: NEGATIVE
PROT SERPL-MCNC: 7.2 G/DL (ref 6–8.2)
RBC # BLD AUTO: 5.62 M/UL (ref 4.7–6.1)
SODIUM SERPL-SCNC: 137 MMOL/L (ref 135–145)
TRIGL SERPL-MCNC: 97 MG/DL (ref 0–149)
TROPONIN T SERPL-MCNC: 6 NG/L (ref 6–19)
TROPONIN T SERPL-MCNC: 8 NG/L (ref 6–19)
TROPONIN T SERPL-MCNC: <6 NG/L (ref 6–19)
WBC # BLD AUTO: 5.3 K/UL (ref 4.8–10.8)

## 2020-03-10 PROCEDURE — 93005 ELECTROCARDIOGRAM TRACING: CPT | Performed by: EMERGENCY MEDICINE

## 2020-03-10 PROCEDURE — 80307 DRUG TEST PRSMV CHEM ANLYZR: CPT

## 2020-03-10 PROCEDURE — 93005 ELECTROCARDIOGRAM TRACING: CPT

## 2020-03-10 PROCEDURE — 93306 TTE W/DOPPLER COMPLETE: CPT | Mod: 26 | Performed by: INTERNAL MEDICINE

## 2020-03-10 PROCEDURE — 83735 ASSAY OF MAGNESIUM: CPT

## 2020-03-10 PROCEDURE — 85025 COMPLETE CBC W/AUTO DIFF WBC: CPT

## 2020-03-10 PROCEDURE — 71046 X-RAY EXAM CHEST 2 VIEWS: CPT

## 2020-03-10 PROCEDURE — 99213 OFFICE O/P EST LOW 20 MIN: CPT | Performed by: PHYSICIAN ASSISTANT

## 2020-03-10 PROCEDURE — 83880 ASSAY OF NATRIURETIC PEPTIDE: CPT

## 2020-03-10 PROCEDURE — 99220 PR INITIAL OBSERVATION CARE,LEVL III: CPT | Performed by: INTERNAL MEDICINE

## 2020-03-10 PROCEDURE — 94760 N-INVAS EAR/PLS OXIMETRY 1: CPT

## 2020-03-10 PROCEDURE — 84484 ASSAY OF TROPONIN QUANT: CPT | Mod: 91

## 2020-03-10 PROCEDURE — 80061 LIPID PANEL: CPT

## 2020-03-10 PROCEDURE — 80053 COMPREHEN METABOLIC PANEL: CPT

## 2020-03-10 PROCEDURE — 83036 HEMOGLOBIN GLYCOSYLATED A1C: CPT

## 2020-03-10 PROCEDURE — 99285 EMERGENCY DEPT VISIT HI MDM: CPT

## 2020-03-10 PROCEDURE — 85379 FIBRIN DEGRADATION QUANT: CPT

## 2020-03-10 PROCEDURE — 93306 TTE W/DOPPLER COMPLETE: CPT

## 2020-03-10 PROCEDURE — G0378 HOSPITAL OBSERVATION PER HR: HCPCS

## 2020-03-10 PROCEDURE — A9270 NON-COVERED ITEM OR SERVICE: HCPCS | Performed by: EMERGENCY MEDICINE

## 2020-03-10 PROCEDURE — 700102 HCHG RX REV CODE 250 W/ 637 OVERRIDE(OP): Performed by: EMERGENCY MEDICINE

## 2020-03-10 RX ORDER — PROMETHAZINE HYDROCHLORIDE 25 MG/1
12.5-25 TABLET ORAL EVERY 4 HOURS PRN
Status: DISCONTINUED | OUTPATIENT
Start: 2020-03-10 | End: 2020-03-11 | Stop reason: HOSPADM

## 2020-03-10 RX ORDER — NITROGLYCERIN 0.4 MG/1
0.4 TABLET SUBLINGUAL
Status: DISCONTINUED | OUTPATIENT
Start: 2020-03-10 | End: 2020-03-11 | Stop reason: HOSPADM

## 2020-03-10 RX ORDER — AMOXICILLIN 250 MG
2 CAPSULE ORAL 2 TIMES DAILY
Status: DISCONTINUED | OUTPATIENT
Start: 2020-03-10 | End: 2020-03-11 | Stop reason: HOSPADM

## 2020-03-10 RX ORDER — POLYETHYLENE GLYCOL 3350 17 G/17G
1 POWDER, FOR SOLUTION ORAL
Status: DISCONTINUED | OUTPATIENT
Start: 2020-03-10 | End: 2020-03-11 | Stop reason: HOSPADM

## 2020-03-10 RX ORDER — AMINOPHYLLINE 25 MG/ML
100 INJECTION, SOLUTION INTRAVENOUS
Status: DISCONTINUED | OUTPATIENT
Start: 2020-03-10 | End: 2020-03-11 | Stop reason: HOSPADM

## 2020-03-10 RX ORDER — BISACODYL 10 MG
10 SUPPOSITORY, RECTAL RECTAL
Status: DISCONTINUED | OUTPATIENT
Start: 2020-03-10 | End: 2020-03-11 | Stop reason: HOSPADM

## 2020-03-10 RX ORDER — ONDANSETRON 2 MG/ML
4 INJECTION INTRAMUSCULAR; INTRAVENOUS EVERY 4 HOURS PRN
Status: DISCONTINUED | OUTPATIENT
Start: 2020-03-10 | End: 2020-03-11 | Stop reason: HOSPADM

## 2020-03-10 RX ORDER — ASPIRIN 81 MG/1
324 TABLET, CHEWABLE ORAL DAILY
Status: DISCONTINUED | OUTPATIENT
Start: 2020-03-11 | End: 2020-03-11 | Stop reason: HOSPADM

## 2020-03-10 RX ORDER — REGADENOSON 0.08 MG/ML
0.4 INJECTION, SOLUTION INTRAVENOUS
Status: DISCONTINUED | OUTPATIENT
Start: 2020-03-10 | End: 2020-03-11 | Stop reason: HOSPADM

## 2020-03-10 RX ORDER — ACETAMINOPHEN 325 MG/1
650 TABLET ORAL EVERY 6 HOURS PRN
Status: DISCONTINUED | OUTPATIENT
Start: 2020-03-10 | End: 2020-03-11 | Stop reason: HOSPADM

## 2020-03-10 RX ORDER — LABETALOL HYDROCHLORIDE 5 MG/ML
10 INJECTION, SOLUTION INTRAVENOUS EVERY 4 HOURS PRN
Status: DISCONTINUED | OUTPATIENT
Start: 2020-03-10 | End: 2020-03-11 | Stop reason: HOSPADM

## 2020-03-10 RX ORDER — ALUMINA, MAGNESIA, AND SIMETHICONE 2400; 2400; 240 MG/30ML; MG/30ML; MG/30ML
30 SUSPENSION ORAL EVERY 4 HOURS PRN
Status: DISCONTINUED | OUTPATIENT
Start: 2020-03-10 | End: 2020-03-11 | Stop reason: HOSPADM

## 2020-03-10 RX ORDER — CLONIDINE HYDROCHLORIDE 0.1 MG/1
0.1 TABLET ORAL EVERY 6 HOURS PRN
Status: DISCONTINUED | OUTPATIENT
Start: 2020-03-10 | End: 2020-03-11 | Stop reason: HOSPADM

## 2020-03-10 RX ORDER — ASPIRIN 300 MG/1
300 SUPPOSITORY RECTAL DAILY
Status: DISCONTINUED | OUTPATIENT
Start: 2020-03-11 | End: 2020-03-11 | Stop reason: HOSPADM

## 2020-03-10 RX ORDER — PROMETHAZINE HYDROCHLORIDE 25 MG/1
12.5-25 SUPPOSITORY RECTAL EVERY 4 HOURS PRN
Status: DISCONTINUED | OUTPATIENT
Start: 2020-03-10 | End: 2020-03-11 | Stop reason: HOSPADM

## 2020-03-10 RX ORDER — ENALAPRILAT 1.25 MG/ML
1.25 INJECTION INTRAVENOUS EVERY 6 HOURS PRN
Status: DISCONTINUED | OUTPATIENT
Start: 2020-03-10 | End: 2020-03-11 | Stop reason: HOSPADM

## 2020-03-10 RX ORDER — ASPIRIN 81 MG/1
324 TABLET, CHEWABLE ORAL ONCE
Status: COMPLETED | OUTPATIENT
Start: 2020-03-10 | End: 2020-03-10

## 2020-03-10 RX ORDER — PROCHLORPERAZINE EDISYLATE 5 MG/ML
5-10 INJECTION INTRAMUSCULAR; INTRAVENOUS EVERY 4 HOURS PRN
Status: DISCONTINUED | OUTPATIENT
Start: 2020-03-10 | End: 2020-03-11 | Stop reason: HOSPADM

## 2020-03-10 RX ORDER — ASPIRIN 325 MG
325 TABLET ORAL DAILY
Status: DISCONTINUED | OUTPATIENT
Start: 2020-03-11 | End: 2020-03-11 | Stop reason: HOSPADM

## 2020-03-10 RX ORDER — ONDANSETRON 4 MG/1
4 TABLET, ORALLY DISINTEGRATING ORAL EVERY 4 HOURS PRN
Status: DISCONTINUED | OUTPATIENT
Start: 2020-03-10 | End: 2020-03-11 | Stop reason: HOSPADM

## 2020-03-10 RX ORDER — MORPHINE SULFATE 4 MG/ML
1-2 INJECTION, SOLUTION INTRAMUSCULAR; INTRAVENOUS
Status: DISCONTINUED | OUTPATIENT
Start: 2020-03-10 | End: 2020-03-11 | Stop reason: HOSPADM

## 2020-03-10 RX ADMIN — ASPIRIN 81 MG 324 MG: 81 TABLET ORAL at 12:19

## 2020-03-10 SDOH — HEALTH STABILITY: MENTAL HEALTH: HOW OFTEN DO YOU HAVE A DRINK CONTAINING ALCOHOL?: MONTHLY OR LESS

## 2020-03-10 ASSESSMENT — LIFESTYLE VARIABLES
AVERAGE NUMBER OF DAYS PER WEEK YOU HAVE A DRINK CONTAINING ALCOHOL: 0
TOTAL SCORE: 0
HAVE PEOPLE ANNOYED YOU BY CRITICIZING YOUR DRINKING: NO
HOW MANY TIMES IN THE PAST YEAR HAVE YOU HAD 5 OR MORE DRINKS IN A DAY: 0
TOTAL SCORE: 0
CONSUMPTION TOTAL: NEGATIVE
HAVE YOU EVER FELT YOU SHOULD CUT DOWN ON YOUR DRINKING: NO
ON A TYPICAL DAY WHEN YOU DRINK ALCOHOL HOW MANY DRINKS DO YOU HAVE: 0
EVER HAD A DRINK FIRST THING IN THE MORNING TO STEADY YOUR NERVES TO GET RID OF A HANGOVER: NO
ALCOHOL_USE: NO
TOTAL SCORE: 0
EVER_SMOKED: YES
EVER_SMOKED: YES
DOES PATIENT WANT TO STOP DRINKING: NO
EVER FELT BAD OR GUILTY ABOUT YOUR DRINKING: NO

## 2020-03-10 ASSESSMENT — ENCOUNTER SYMPTOMS
COUGH: 0
SPUTUM PRODUCTION: 0
PALPITATIONS: 0
FEVER: 0
HEADACHES: 0
DIARRHEA: 0
CHILLS: 0
HEMOPTYSIS: 0
ABDOMINAL PAIN: 0
SHORTNESS OF BREATH: 1
NAUSEA: 0
DIZZINESS: 0
FOCAL WEAKNESS: 0
VOMITING: 0
SENSORY CHANGE: 0
WHEEZING: 0

## 2020-03-10 ASSESSMENT — FIBROSIS 4 INDEX: FIB4 SCORE: 0.7

## 2020-03-10 ASSESSMENT — PATIENT HEALTH QUESTIONNAIRE - PHQ9
2. FEELING DOWN, DEPRESSED, IRRITABLE, OR HOPELESS: NOT AT ALL
1. LITTLE INTEREST OR PLEASURE IN DOING THINGS: NOT AT ALL
2. FEELING DOWN, DEPRESSED, IRRITABLE, OR HOPELESS: NOT AT ALL
SUM OF ALL RESPONSES TO PHQ9 QUESTIONS 1 AND 2: 0
SUM OF ALL RESPONSES TO PHQ9 QUESTIONS 1 AND 2: 0
1. LITTLE INTEREST OR PLEASURE IN DOING THINGS: NOT AT ALL

## 2020-03-10 ASSESSMENT — COPD QUESTIONNAIRES
DURING THE PAST 4 WEEKS HOW MUCH DID YOU FEEL SHORT OF BREATH: NONE/LITTLE OF THE TIME
DURING THE PAST 4 WEEKS HOW MUCH DID YOU FEEL SHORT OF BREATH: SOME OF THE TIME
DO YOU EVER COUGH UP ANY MUCUS OR PHLEGM?: NO/ONLY WITH OCCASIONAL COLDS OR INFECTIONS
IN THE PAST 12 MONTHS DO YOU DO LESS THAN YOU USED TO BECAUSE OF YOUR BREATHING PROBLEMS: DISAGREE/UNSURE
HAVE YOU SMOKED AT LEAST 100 CIGARETTES IN YOUR ENTIRE LIFE: YES
HAVE YOU SMOKED AT LEAST 100 CIGARETTES IN YOUR ENTIRE LIFE: NO/DON'T KNOW
COPD SCREENING SCORE: 4
DO YOU EVER COUGH UP ANY MUCUS OR PHLEGM?: NO/ONLY WITH OCCASIONAL COLDS OR INFECTIONS
COPD SCREENING SCORE: 0

## 2020-03-10 NOTE — ED TRIAGE NOTES
Pt states he was sent from urgent care for EKG changes. Pt has complained of sob with exertion for many months and occasional chest discomfort. NAD in triage. VSS. Explained triage process, to waiting room. Asked to inform RN if questions or concerns arise.

## 2020-03-10 NOTE — ED PROVIDER NOTES
ED Provider Note    Scribed for Romero Milligan M.D. by Tyra Zaidi. 3/10/2020, 11:14 AM.    Primary care provider: SHILO Hdz  Means of arrival: Walk-in  History obtained from: Patient  History limited by: None    CHIEF COMPLAINT  Chief Complaint   Patient presents with   • Shortness of Breath   • Chest Pain   • Abnormal EKG     HPI  Romero Baugh is a 34 y.o. male who presents to the Emergency Department for evaluation of chest pain and shortness of breath. The patient woke up at 3 AM with squeezing pain on his right side which radiates to his neck. He reports tingling in his upper extremities and states that he is anxious. His wife states that he is short of breath in his sleep due to taking shallow breaths. The patient reports that he has had symptoms like this since 2017 and states that the pain begins in the early mornings. He says that he has episodes of this pain twice a month. He was referred to cardiology by his PCP in November 2019 but has yet to be evaluate. 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 The patient reports that he is so short of breath secondary to the chest pain that he cannot walk upstairs without getting out of breath. The patient has a possible family history of stroke and heart attack. His father had a possible stroke at age 34. He has no family history of diabetes or hyperlipidemia. The patient had pre hypertension when he was smoking but quit in 2016. The patient denies a history of DVT or PE. He denies any methamphetamines or cocaine use.     REVIEW OF SYSTEMS  Pertinent positives include chest pain, shortness of breath, and tingling in upper extremities.   As above, all other systems reviewed and are negative.   See HPI for further details.     PAST MEDICAL HISTORY   Pre hypertension    SURGICAL HISTORY   has a past surgical history that  "includes septorhinoplasty ().    SOCIAL HISTORY  Social History     Tobacco Use   • Smoking status: Former Smoker     Packs/day: 1.00     Years: 15.00     Pack years: 15.00     Types: Cigarettes     Last attempt to quit: 3/1/2016     Years since quittin.0   • Smokeless tobacco: Former User   Substance Use Topics   • Alcohol use: Yes     Frequency: Monthly or less   • Drug use: No      Social History     Substance and Sexual Activity   Drug Use No     FAMILY HISTORY  Family History   Problem Relation Age of Onset   • Heart Disease Mother         valve replacment   • Hypertension Father    • Stroke Father    • Alcohol/Drug Father      CURRENT MEDICATIONS  Home Medications     Reviewed by Gina Yañez (Pharmacy Tech) on 03/10/20 at 1253  Med List Status: Complete   Medication Last Dose Status        Patient Morteza Taking any Medications                     ALLERGIES  Allergies   Allergen Reactions   • Iodine    • Shellfish Allergy        PHYSICAL EXAM  VITAL SIGNS: /74   Pulse (!) 58   Temp 36.3 °C (97.4 °F) (Temporal)   Resp 16   Ht 1.854 m (6' 1\")   Wt 83.5 kg (184 lb 1.4 oz)   SpO2 99%   BMI 24.29 kg/m²   Vitals reviewed.  Constitutional: Alert.   HENT: No signs of trauma, Bilateral external ears normal, Nose normal.   Eyes: Pupils are equal and reactive, Conjunctiva normal, Non-icteric.   Neck: Normal range of motion, No tenderness, Supple, No stridor.   Lymphatic: No lymphadenopathy noted.   Cardiovascular: Regular rate and rhythm, no murmurs.   Thorax & Lungs: Normal breath sounds, No respiratory distress, No wheezing, No chest tenderness.   Abdomen: Bowel sounds normal, Soft, No tenderness, No peritoneal signs, No masses, No pulsatile masses.   Skin: Warm, Dry, No erythema, No rash.   Extremities: Intact distal pulses, No edema, No tenderness, No cyanosis  Musculoskeletal: Point tenderness in right costal joints. Good range of motion in all major joints. No tenderness to palpation " or major deformities noted.   Neurologic: Alert , Normal motor function, Normal sensory function, No focal deficits noted.   Psychiatric: Affect normal, Judgment normal, Mood normal.     DIAGNOSTIC STUDIES / PROCEDURES    LABS  Labs Reviewed   CBC WITH DIFFERENTIAL - Abnormal; Notable for the following components:       Result Value    MCHC 33.5 (*)     All other components within normal limits   LIPID PROFILE - Abnormal; Notable for the following components:    HDL 39 (*)      (*)     All other components within normal limits   COMP METABOLIC PANEL   TROPONIN   PROBRAIN NATRIURETIC PEPTIDE, NT   ESTIMATED GFR   HEMOGLOBIN A1C   MAGNESIUM   D-DIMER   TROPONIN   URINE DRUG SCREEN      All labs reviewed by me.    EKG Interpretation:  Interpreted by me    12 Lead EKG interpreted by me to show:  Sinus bradycardia  Rate 57  Axis: Normal  LVH  Normal T waves  Diffuse ST elevation  My impression of this EKG: Does not indicate STEMI but cannot rule out ischemia.     COURSE & MEDICAL DECISION MAKING  Nursing notes, VS, PMSFHx reviewed in chart.  Differential diagnoses include but not limited to: ACS, noncardiac chest pain    11:14 AM Patient seen and examined at bedside. Ordered for EKG, CBC with differential, CMP, troponin STAT, and proBrain Natriuretic Peptide to evaluate. Does not appear to be a person who is having an aortic dissection.     The patient was ruled out for PE by PERC criteria:    AGE < 50  No estrogens, BCPs, recent surgery (4 weeks)  No hx of: DVT/PE or hemoptysis  No unilateral leg swelling  Pulse Ox > 94% and HR <100    11:17 AM I reviewed the patient's two view chest x-ray completed at 9:29 AM. It was unremarkable via radiologist.     12:09 PM I informed the patient that he will be hospitalized in order to obtain a stress test.    12:11 PM I spoke with Dr. Puckett ([facility] hospitalist) who agrees to assess the patient for hospitalization. Heart Score is 3.    12:12 PM Patient will be treated with  Aspirin 324 mg chewable tab.     DISPOSITION:  Patient will be hospitalized by Dr. Puckett in gaurded condition.    FINAL IMPRESSION  1. Acute chest pain    2. Shortness of breath          ITyra (Scribe), am scribing for, and in the presence of, Romero Milligan M.D..    Electronically signed by: Tyra Zaidi (Giovannyibe), 3/10/2020    IRomero M.D. personally performed the services described in this documentation, as scribed by Tyra Zaidi in my presence, and it is both accurate and complete.    C.     The note accurately reflects work and decisions made by me.  Romero Milligan M.D.  3/10/2020  2:04 PM

## 2020-03-10 NOTE — LETTER
March 10, 2020       Patient: Romero Baugh   YOB: 1985   Date of Visit: 3/10/2020         To Whom It May Concern:    It is my medical opinion that Romero Baugh should be excused from work for today due to illness.      If you have any questions or concerns, please don't hesitate to call [phone]          Sincerely,          Pilo Claire P.A.-C.  Electronically Signed

## 2020-03-10 NOTE — PROGRESS NOTES
Subjective:   Romero Baugh  is a 34 y.o. male who presents for Shoulder Pain (bilateral shoulder pain, troubles breathing, tingling down L [id number] )        Patient comes clinic to discuss last few years of slowly progressing sensation of shortness of breath or difficulty getting a full breath with some sensation of worsening more over the last 3 to 4 months.  He notes some intermittent tingling down left arm.  He notes this problem bothers him the most when he wakes from sleep in the middle the night and becomes more aware of his breathing.  Additionally he notes tingling to bilateral arms worse when he wakes from sleep as well.  He denies noting much daytime symptoms of concern or chest discomfort but does admit to when focusing on breathing at night becoming concerned with his heart rate and then feeling some pain associated.  He notes he works out and denies exertional chest pain but does note decreased capacity to run distances secondary to sensation of shortness of breath he states when he moved here years ago he could run 5 miles an hour only 3.  He denies fevers chills or cough.  He denies wheezing or history of asthma.  He denies pneumonia history but admits to some concerned about this.  He notes episodic bronchitis over his life but none recently.  He denies fevers chills or cough recently.  He denies swelling to legs or history of DVT PE or any clotting or bleeding disorder.  He states he is messaged his primary care about this but not seen them about this.  He notes he has pending pulmonology referral in April around 4 weeks away.  He is frustrated with how long it is taking him to see pulmonology.    Review of Systems   Constitutional: Negative for chills, fever and malaise/fatigue.   Respiratory: Positive for shortness of breath. Negative for cough, hemoptysis, sputum production and wheezing.    Cardiovascular: Positive for chest pain ( Rarely; associated with shortness of breath). Negative for  "palpitations and leg swelling.   Gastrointestinal: Negative for abdominal pain, diarrhea, nausea and vomiting.   Skin: Negative for rash.   Neurological: Negative for dizziness, sensory change, focal weakness and headaches.     Allergies   Allergen Reactions   • Iodine    • Shellfish Allergy       Objective:   /90   Pulse 79   Temp 36.6 °C (97.9 °F) (Temporal)   Resp 18   Ht 1.854 m (6' 1\")   Wt 81.6 kg (180 lb)   SpO2 96%   BMI 23.75 kg/m²   Physical Exam  Vitals signs and nursing note reviewed.   Constitutional:       General: He is not in acute distress.     Appearance: He is well-developed. He is not diaphoretic.   HENT:      Head: Normocephalic and atraumatic.      Right Ear: Tympanic membrane, ear canal and external ear normal.      Left Ear: Tympanic membrane, ear canal and external ear normal.      Nose: Nose normal.      Mouth/Throat:      Pharynx: Uvula midline. Posterior oropharyngeal erythema ( mild PND) present. No oropharyngeal exudate.      Tonsils: No tonsillar abscesses.   Eyes:      General: No scleral icterus.        Right eye: No discharge.         Left eye: No discharge.      Conjunctiva/sclera: Conjunctivae normal.   Neck:      Musculoskeletal: Neck supple.   Cardiovascular:      Rate and Rhythm: Normal rate and regular rhythm.  No extrasystoles are present.     Pulses: Normal pulses.   Pulmonary:      Effort: Pulmonary effort is normal. No respiratory distress.      Breath sounds: Normal breath sounds. No stridor. No decreased breath sounds, wheezing, rhonchi or rales.   Musculoskeletal: Normal range of motion.      Right lower leg: No edema.      Left lower leg: No edema.   Lymphadenopathy:      Cervical: Cervical adenopathy present.   Skin:     General: Skin is warm and dry.      Coloration: Skin is not pale.   Neurological:      Mental Status: He is alert and oriented to person, place, and time.      Coordination: Coordination normal.     CXR -   IMPRESSION:     1.  " Unremarkable two view chest.             Last Resulted: 03/10/20  9:31 AM           ECG - in the office reveals a sinus rhythm with a rate of 56. There is no ectopy, depression, no signs of ischemia or infarct but there is some ST elevation likely representing early repolarization pattern        Assessment/Plan:   1. Acute respiratory distress  - DX-CHEST-2 VIEWS; Future  - EKG  - REFERRAL TO FOLLOW-UP WITH PRIMARY CARE    2. Chronic shortness of breath    3. Chest pain, unspecified type    Patient has been directed to [facility] for further management/work up, I have reiterated to patient that I am unable to further r/o cardiac causes of complaints and will require further testing today.

## 2020-03-10 NOTE — H&P
"Hospital Medicine History & Physical Note    Date of Service  3/10/2020    Primary Care Physician  SHILO Hdz    Consultants  None    Code Status  Full    Chief Complaint  Chest pain    History of Presenting Illness  34 y.o. male without much past medical history, who presented 3/10/2020 with chest pain.  Patient states that in the last 3 years, he has been having on and off/intermittent chest pain, mostly on the right side on his lower pectoralis, occasionally radiating to the back, edge of the shoulder, and left fingers, and further described as \"pinch, contraction, and tightness\", usually comes on when sleeping awakening him up, sometimes when he is in a treadmill, and sometimes when he is very stressed out.  He states each episode last for about 30 minutes to as long as 3 hours, and rated up to 8 out of 10 in severity, better when he relaxes himself, and walks around in the neighborhood.  The episodes were associated with anxiety, slight shortness of breath, palpitations, and warm sensation and diaphoresis.  He denied any fevers, chills, diarrhea, or abdominal pain.  This morning, he had another episode which woke him up, and associated with left arm tingling which prompted him to go to the ED.    ED course:  The patient was initially evaluated.  Vital signs were stable.  Initial blood work-up were unremarkable.  EKG (personally reviewed) showed no dynamic ischemic changes.  Troponin was negative.  Chest x-ray (personally reviewed) did not show any infiltrates, consolidation, effusion or pneumothorax.  Patient was given aspirin, and was subsequently admitted to the hospitalist service for further cardiac work-up.    Review of Systems  ROS     Pertinent positives/negatives as mentioned above.     A complete review of systems was personally done by me. All other systems were negative.       Past Medical History  Reviewed. No past medical history.     Surgical History   has a past surgical history " that includes septorhinoplasty (2009).     Family History  family history includes Alcohol/Drug in his father; Heart Disease in his mother; Hypertension in his father; Stroke in his father.     Social History   reports that he quit smoking about 4 years ago. His smoking use included cigarettes. He has a 15.00 pack-year smoking history. He has quit using smokeless tobacco. He reports current alcohol use. He reports that he does not use drugs.    Allergies  Allergies   Allergen Reactions   • Iodine    • Shellfish Allergy        Medications  None       Physical Exam  Temp:  [36.3 °C (97.3 °F)-36.3 °C (97.4 °F)] 36.3 °C (97.3 °F)  Pulse:  [58-62] 60  Resp:  [16-18] 18  BP: (118-144)/(74-82) 144/82  SpO2:  [96 %-99 %] 97 %    Physical Exam  Vitals signs reviewed.   Constitutional:       General: He is not in acute distress.     Appearance: Normal appearance. He is not toxic-appearing or diaphoretic.   HENT:      Head: Normocephalic and atraumatic.      Right Ear: External ear normal.      Left Ear: External ear normal.      Nose: Nose normal.      Mouth/Throat:      Mouth: Mucous membranes are moist.      Pharynx: No oropharyngeal exudate.   Eyes:      General: No scleral icterus.     Extraocular Movements: Extraocular movements intact.      Conjunctiva/sclera: Conjunctivae normal.      Pupils: Pupils are equal, round, and reactive to light.   Neck:      Musculoskeletal: Normal range of motion and neck supple.   Cardiovascular:      Rate and Rhythm: Normal rate and regular rhythm.      Heart sounds: Normal heart sounds. No murmur. No gallop.    Pulmonary:      Effort: Pulmonary effort is normal. No respiratory distress.      Breath sounds: Normal breath sounds. No stridor. No wheezing, rhonchi or rales.   Chest:      Chest wall: Tenderness ( right lower chest wall) present.   Abdominal:      General: Bowel sounds are normal. There is no distension.      Palpations: Abdomen is soft. There is no mass.      Tenderness:  There is no abdominal tenderness. There is no guarding or rebound.   Musculoskeletal: Normal range of motion.         General: No swelling.      Right lower leg: No edema.      Left lower leg: No edema.   Lymphadenopathy:      Cervical: No cervical adenopathy.   Skin:     General: Skin is warm and dry.      Coloration: Skin is not jaundiced.      Findings: No rash.   Neurological:      General: No focal deficit present.      Mental Status: He is alert and oriented to person, place, and time.      Cranial Nerves: No cranial nerve deficit.   Psychiatric:         Mood and Affect: Mood normal.         Behavior: Behavior normal.         Thought Content: Thought content normal.         Judgment: Judgment normal.         Laboratory:  Recent Labs     03/10/20  1120   WBC 5.3   RBC 5.62   HEMOGLOBIN 16.8   HEMATOCRIT 50.1   MCV 89.1   MCH 29.9   MCHC 33.5*   RDW 39.5   PLATELETCT 207   MPV 9.5     Recent Labs     03/10/20  1120   SODIUM 137   POTASSIUM 4.2   CHLORIDE 104   CO2 25   GLUCOSE 91   BUN 16   CREATININE 1.09   CALCIUM 9.9     Recent Labs     03/10/20  1120   ALTSGPT 16   ASTSGOT 17   ALKPHOSPHAT 56   TBILIRUBIN 0.7   GLUCOSE 91         Recent Labs     03/10/20  1120   NTPROBNP 24         Recent Labs     03/10/20  1120   TROPONINT <6       Urinalysis:    No results found     Imaging:  EC-ECHOCARDIOGRAM COMPLETE W/O CONT    (Results Pending)   NM-CARDIAC STRESS TEST    (Results Pending)         Assessment/Plan:  I anticipate this patient is appropriate for observation status at this time.    * Pain in the chest- (present on admission)  Assessment & Plan  -Could be musculoskeletal, with reproducible chest tenderness.  However, reasonable to pursue further noninvasive cardiac work-up.  -I will obtain serial troponins, and an echocardiogram.  If troponins remain negative, would proceed with nuclear cardiac stress test in the morning.  -Start empiric aspirin for now until cardiac cause ruled out.  Check lipid profile  and hemoglobin A1c for further risk stratification.  -Start as needed sublingual nitroglycerin, and morphine for pain recurrence.   -We will do further cardiac monitoring to rule out arrhythmias.  -For completion, I will obtain a d-dimer, and if elevated will proceed with ruling out PE.  Check urine drug screen.  -For possibility of GI related chest pain, I will start him on PRN GI cocktail, and Maalox.        VTE prophylaxis: SCD

## 2020-03-10 NOTE — ED NOTES
Pt ambulatory to [facility].  Connected to cardiac monitor, BP cuff, and continuous pulse ox upon arrival.  Pt in gown, call light in reach, bed in lowest position.  Resident at bedside.  Blood drawn and sent to lab.

## 2020-03-10 NOTE — ASSESSMENT & PLAN NOTE
-Could be musculoskeletal, with reproducible chest tenderness.  However, reasonable to pursue further noninvasive cardiac work-up.  -I will obtain serial troponins, and an echocardiogram.  If troponins remain negative, would proceed with nuclear cardiac stress test in the morning.  -Start empiric aspirin for now until cardiac cause ruled out.  Check lipid profile and hemoglobin A1c for further risk stratification.  -Start as needed sublingual nitroglycerin, and morphine for pain recurrence.   -We will do further cardiac monitoring to rule out arrhythmias.  -For completion, I will obtain a d-dimer, and if elevated will proceed with ruling out PE.  Check urine drug screen.  -For possibility of GI related chest pain, I will start him on PRN GI cocktail, and Maalox.

## 2020-03-10 NOTE — ED NOTES
Pt denies taking any OTC or prescription medications  Allergies reviewed  No ABX in last 14 days

## 2020-03-11 ENCOUNTER — APPOINTMENT (OUTPATIENT)
Dept: RADIOLOGY | Facility: MEDICAL CENTER | Age: 35
End: 2020-03-11
Attending: INTERNAL MEDICINE
Payer: OTHER GOVERNMENT

## 2020-03-11 VITALS
TEMPERATURE: 98.1 F | OXYGEN SATURATION: 95 % | SYSTOLIC BLOOD PRESSURE: 126 MMHG | RESPIRATION RATE: 16 BRPM | BODY MASS INDEX: 24.51 KG/M2 | DIASTOLIC BLOOD PRESSURE: 75 MMHG | HEART RATE: 66 BPM | WEIGHT: 184.97 LBS | HEIGHT: 73 IN

## 2020-03-11 LAB
ANION GAP SERPL CALC-SCNC: 8 MMOL/L (ref 0–11.9)
BASOPHILS # BLD AUTO: 0.6 % (ref 0–1.8)
BASOPHILS # BLD: 0.04 K/UL (ref 0–0.12)
BUN SERPL-MCNC: 16 MG/DL (ref 8–22)
CALCIUM SERPL-MCNC: 9.5 MG/DL (ref 8.5–10.5)
CHLORIDE SERPL-SCNC: 106 MMOL/L (ref 96–112)
CO2 SERPL-SCNC: 24 MMOL/L (ref 20–33)
CREAT SERPL-MCNC: 1.15 MG/DL (ref 0.5–1.4)
EOSINOPHIL # BLD AUTO: 0.24 K/UL (ref 0–0.51)
EOSINOPHIL NFR BLD: 3.6 % (ref 0–6.9)
ERYTHROCYTE [DISTWIDTH] IN BLOOD BY AUTOMATED COUNT: 37.6 FL (ref 35.9–50)
GLUCOSE SERPL-MCNC: 93 MG/DL (ref 65–99)
HCT VFR BLD AUTO: 47.2 % (ref 42–52)
HGB BLD-MCNC: 16.6 G/DL (ref 14–18)
IMM GRANULOCYTES # BLD AUTO: 0.01 K/UL (ref 0–0.11)
IMM GRANULOCYTES NFR BLD AUTO: 0.1 % (ref 0–0.9)
LYMPHOCYTES # BLD AUTO: 2.18 K/UL (ref 1–4.8)
LYMPHOCYTES NFR BLD: 32.3 % (ref 22–41)
MCH RBC QN AUTO: 30.2 PG (ref 27–33)
MCHC RBC AUTO-ENTMCNC: 35.2 G/DL (ref 33.7–35.3)
MCV RBC AUTO: 86 FL (ref 81.4–97.8)
MONOCYTES # BLD AUTO: 0.68 K/UL (ref 0–0.85)
MONOCYTES NFR BLD AUTO: 10.1 % (ref 0–13.4)
NEUTROPHILS # BLD AUTO: 3.59 K/UL (ref 1.82–7.42)
NEUTROPHILS NFR BLD: 53.3 % (ref 44–72)
NRBC # BLD AUTO: 0 K/UL
NRBC BLD-RTO: 0 /100 WBC
PLATELET # BLD AUTO: 230 K/UL (ref 164–446)
PMV BLD AUTO: 9.4 FL (ref 9–12.9)
POTASSIUM SERPL-SCNC: 3.8 MMOL/L (ref 3.6–5.5)
RBC # BLD AUTO: 5.49 M/UL (ref 4.7–6.1)
SODIUM SERPL-SCNC: 138 MMOL/L (ref 135–145)
WBC # BLD AUTO: 6.7 K/UL (ref 4.8–10.8)

## 2020-03-11 PROCEDURE — 99217 PR OBSERVATION CARE DISCHARGE: CPT | Performed by: INTERNAL MEDICINE

## 2020-03-11 PROCEDURE — A9502 TC99M TETROFOSMIN: HCPCS

## 2020-03-11 PROCEDURE — A9270 NON-COVERED ITEM OR SERVICE: HCPCS | Performed by: INTERNAL MEDICINE

## 2020-03-11 PROCEDURE — 700102 HCHG RX REV CODE 250 W/ 637 OVERRIDE(OP): Performed by: INTERNAL MEDICINE

## 2020-03-11 PROCEDURE — G0378 HOSPITAL OBSERVATION PER HR: HCPCS

## 2020-03-11 PROCEDURE — 80048 BASIC METABOLIC PNL TOTAL CA: CPT

## 2020-03-11 PROCEDURE — 85025 COMPLETE CBC W/AUTO DIFF WBC: CPT

## 2020-03-11 RX ADMIN — ASPIRIN 81 MG 324 MG: 81 TABLET ORAL at 05:12

## 2020-03-11 ASSESSMENT — PATIENT HEALTH QUESTIONNAIRE - PHQ9
1. LITTLE INTEREST OR PLEASURE IN DOING THINGS: NOT AT ALL
2. FEELING DOWN, DEPRESSED, IRRITABLE, OR HOPELESS: NOT AT ALL
SUM OF ALL RESPONSES TO PHQ9 QUESTIONS 1 AND 2: 0

## 2020-03-11 NOTE — DISCHARGE SUMMARY
"Discharge Summary    CHIEF COMPLAINT ON ADMISSION  Chief Complaint   Patient presents with   • Shortness of Breath   • Chest Pain   • Abnormal EKG       Reason for Admission  Sent by Urgent Care     Admission Date  3/10/2020    CODE STATUS  Full Code    HPI & HOSPITAL COURSE  This is a 34 y.o. male without much past medical history, who presented 3/10/2020 with chest pain.  Patient states that in the last 3 years, he has been having on and off/intermittent chest pain, mostly on the right side on his lower pectoralis, occasionally radiating to the back, edge of the shoulder, and left fingers, and further described as \"pinch, contraction, and tightness\", usually comes on when sleeping awakening him up, sometimes when he is in a treadmill, and sometimes when he is very stressed out.  He states each episode last for about 30 minutes to as long as 3 hours, and rated up to 8 out of 10 in severity, better when he relaxes himself, and walks around in the neighborhood.  The episodes were associated with anxiety, slight shortness of breath, palpitations, and warm sensation and diaphoresis.  He denied any fevers, chills, diarrhea, or abdominal pain.  This morning, he had another episode which woke him up, and associated with left arm tingling which prompted him to go to the ED.     The patient was initially evaluated.  Vital signs were stable.  Initial blood work-up were unremarkable.  EKG showed no dynamic ischemic changes.  Troponin was negative.  Chest x-ray did not show any infiltrates, consolidation, effusion or pneumothorax.  Patient was given aspirin, and was subsequently admitted to the hospitalist service for further work-up.  On evaluation, he had reproducible chest tenderness.  He underwent further cardiac work-up, with subsequent troponins remaining negative.  D-dimer was low.  Echocardiogram was normal, and nuclear cardiac stress test showed no reversible ischemia.  His chest pain was felt to be musculoskeletal, " with component of anxiety.  He was counseled to do nonstrenuous exercises, with low weights, and higher reps exercises for now, and over-the-counter NSAIDs and hot compresses on the chest wall.  His chest pain resolved, with no further recurrences.  He remained hemodynamically stable and afebrile.    I have personally seen and examined the patient on the day of discharge. With his clinical improvement, he was deemed ready to discharge from the hospital as he did not have any further hospitalization needs. Patient felt comfortable going home. The discharge plan was discussed with the patient, with which he was agreeable to.     Therefore, he is discharged in good and stable condition to home with close outpatient follow-up.      Discharge Date  3/11/2020    FOLLOW UP ITEMS POST DISCHARGE  As above    DISCHARGE DIAGNOSES  Principal Problem:    Pain in the chest, likely musculoskeletal etiology POA: Yes  Resolved Problems:    * No resolved hospital problems. *      FOLLOW UP  Future Appointments   Date Time Provider Department Center   3/30/2020 10:20 AM SHILO Hdz [facility]   4/13/2020  2:00 PM Cassie Ramsey M.D. [facility] None     No follow-up provider specified.    MEDICATIONS ON DISCHARGE     Medication List      You have not been prescribed any medications.         Allergies  Allergies   Allergen Reactions   • Iodine    • Shellfish Allergy        DIET  Orders Placed This Encounter   Procedures   • Diet Order Cardiac     Standing Status:   Standing     Number of Occurrences:   1     Order Specific Question:   Diet:     Answer:   Cardiac [6]     Order Specific Question:   Miscellaneous modifications:     Answer:   No Decaf, No Caffeine(for test) [11]       ACTIVITY  As tolerated.  Weight bearing as tolerated    CONSULTATIONS  none    PROCEDURES  none    LABORATORY  Lab Results   Component Value Date    SODIUM 138 03/11/2020    POTASSIUM 3.8 03/11/2020    CHLORIDE 106 03/11/2020    CO2 24 03/11/2020     GLUCOSE 93 03/11/2020    BUN 16 03/11/2020    CREATININE 1.15 03/11/2020        Lab Results   Component Value Date    WBC 6.7 03/11/2020    HEMOGLOBIN 16.6 03/11/2020    HEMATOCRIT 47.2 03/11/2020    PLATELETCT 230 03/11/2020        Total time of the discharge process = 38 minutes.

## 2020-03-11 NOTE — PROGRESS NOTES
Report received from BOGDAN Andres and BOGDAN Austin.  Patient sitting up in bed watching TV after finishing his dinner.  POC gone over with pt and all questions answered at this time.  Patient A & O  X 4.  No apparent signs of distress.  Safety precautions in place.  Patient educated to call for assistance.  Will continue to monitor.

## 2020-03-11 NOTE — DISCHARGE INSTRUCTIONS
Discharge Instructions    Discharged to home by car with self. Discharged via walking, hospital escort: Yes.  Special equipment needed: Not Applicable    Be sure to schedule a follow-up appointment with your primary care doctor or any specialists as instructed.     Discharge Plan:   Diet Plan: Discussed  Activity Level: Discussed  Confirmed Follow up Appointment: Patient to Call and Schedule Appointment  Confirmed Symptoms Management: Discussed  Medication Reconciliation Updated: Yes  Influenza Vaccine Indication: Not indicated: Previously immunized this influenza season and > 8 years of age    I understand that a diet low in cholesterol, fat, and sodium is recommended for good health. Unless I have been given specific instructions below for another diet, I accept this instruction as my diet prescription.   Other diet: REG    Special Instructions: None    · Is patient discharged on Warfarin / Coumadin?   No     Depression / Suicide Risk    As you are discharged from this [facility] facility, it is important to learn how to keep safe from harming yourself.    Recognize the warning signs:  · Abrupt changes in personality, positive or negative- including increase in energy   · Giving away possessions  · Change in eating patterns- significant weight changes-  positive or negative  · Change in sleeping patterns- unable to sleep or sleeping all the time   · Unwillingness or inability to communicate  · Depression  · Unusual sadness, discouragement and loneliness  · Talk of wanting to die  · Neglect of personal appearance   · Rebelliousness- reckless behavior  · Withdrawal from people/activities they love  · Confusion- inability to concentrate     If you or a loved one observes any of these behaviors or has concerns about self-harm, here's what you can do:  · Talk about it- your feelings and reasons for harming yourself  · Remove any means that you might use to hurt yourself (examples: pills, rope, extension cords,  firearm)  · Get professional help from the community (Mental Health, Substance Abuse, psychological counseling)  · Do not be alone:Call your Safe Contact- someone whom you trust who will be there for you.  · Call your local CRISIS HOTLINE 882-4386 or 395-665-5622  · Call your local Children's Mobile Crisis Response Team [place] (605) 439-7920 or www.AdhereTx  · Call the toll free National Suicide Prevention Hotlines   · National Suicide Prevention Lifeline 373-147-PSKN (7205)  · National Hope Line Network 800-SUICIDE (341-1680)

## 2020-03-11 NOTE — CARE PLAN
Problem: Safety  Goal: Will remain free from falls  Outcome: PROGRESSING AS EXPECTED  Note: Pt educated on safety precautions, utilization of the call light, and bed alarm.  Pt verbalized understanding.      Problem: Infection  Goal: Will remain free from infection  Outcome: PROGRESSING AS EXPECTED  Note: Implement standard precautions and perform handwashing before and after patient contact. RN will follow protocols and necessary steps to minimize the spread of infection.  RN educated pt and any visitors on proper hand hygiene.

## 2020-03-11 NOTE — PROGRESS NOTES
Assessment done, Pt educated on plan of care. Waiting on dr rounds  Patient resting in bed, no signs of distress,  no complaints of pain at this time. Call light within reach,  side rails up, will monitor. Condition stable to stress test via w/c and transport

## 2020-03-30 ENCOUNTER — OFFICE VISIT (OUTPATIENT)
Dept: MEDICAL GROUP | Facility: PHYSICIAN GROUP | Age: 35
End: 2020-03-30
Payer: OTHER GOVERNMENT

## 2020-03-30 VITALS
HEIGHT: 74 IN | TEMPERATURE: 98.3 F | OXYGEN SATURATION: 96 % | DIASTOLIC BLOOD PRESSURE: 72 MMHG | WEIGHT: 194 LBS | BODY MASS INDEX: 24.9 KG/M2 | SYSTOLIC BLOOD PRESSURE: 110 MMHG | RESPIRATION RATE: 16 BRPM | HEART RATE: 72 BPM

## 2020-03-30 DIAGNOSIS — R07.9 CHEST PAIN, UNSPECIFIED TYPE: ICD-10-CM

## 2020-03-30 DIAGNOSIS — Z09 HOSPITAL DISCHARGE FOLLOW-UP: ICD-10-CM

## 2020-03-30 DIAGNOSIS — F41.9 ANXIETY: ICD-10-CM

## 2020-03-30 DIAGNOSIS — J30.2 SEASONAL ALLERGIES: ICD-10-CM

## 2020-03-30 PROBLEM — R06.02 SHORTNESS OF BREATH: Status: RESOLVED | Noted: 2017-07-17 | Resolved: 2020-03-30

## 2020-03-30 PROCEDURE — 99214 OFFICE O/P EST MOD 30 MIN: CPT | Performed by: NURSE PRACTITIONER

## 2020-03-30 RX ORDER — LORATADINE 10 MG/1
10 TABLET ORAL DAILY
Qty: 30 TAB | Refills: 11 | Status: SHIPPED | OUTPATIENT
Start: 2020-03-30

## 2020-03-30 ASSESSMENT — FIBROSIS 4 INDEX: FIB4 SCORE: 0.63

## 2020-03-30 ASSESSMENT — PATIENT HEALTH QUESTIONNAIRE - PHQ9: CLINICAL INTERPRETATION OF PHQ2 SCORE: 0

## 2020-03-30 NOTE — ASSESSMENT & PLAN NOTE
Reports increasing anxiety and this may have led to his recent ER/hospitalization.  Is open to looking at some medications.  Working on improving diet and is exercising.    Feels that his job and his wife cause some increasing anxiety. Would like to try some medications.

## 2020-03-30 NOTE — ASSESSMENT & PLAN NOTE
Patient recently seen in the hospital for chest pain with radiation.  All cardiac testing reviewed at this visit and was in normal limits.  He was discharged with the thought of musculoskelatal chest pain as it resolved with NSAIDS.  Possible anxieyt component involved.

## 2020-03-30 NOTE — ASSESSMENT & PLAN NOTE
Pain has resolved.  All cardiac testing was negative at recent hospitalization.  Most likely anxiety component.

## 2020-03-30 NOTE — PROGRESS NOTES
"Chief Complaint   Patient presents with   • Hospital Follow-up       Subjective:   Romero Baugh is a 34 y.o. male here today for evaluation and management of:    Hospital discharge follow-up  Patient recently seen in the hospital for chest pain with radiation.  All cardiac testing reviewed at this visit and was in normal limits.  He was discharged with the thought of musculoskelatal chest pain as it resolved with NSAIDS.  Possible anxieyt component involved.     Anxiety  Reports increasing anxiety and this may have led to his recent ER/hospitalization.  Is open to looking at some medications.  Working on improving diet and is exercising.    Feels that his job and his wife cause some increasing anxiety. Would like to try some medications.     Seasonal allergies  Worsening.  Not taking claritin.  Will restart.      Pain in the chest, likely musculoskeletal etiology  Pain has resolved.  All cardiac testing was negative at recent hospitalization.  Most likely anxiety component.            Current medicines (including changes today)  Current Outpatient Medications   Medication Sig Dispense Refill   • sertraline (ZOLOFT) 50 MG Tab Take 1 Tab by mouth every day. 30 Tab 1   • loratadine (CLARITIN) 10 MG Tab Take 1 Tab by mouth every day. 30 Tab 11     No current facility-administered medications for this visit.      He  has no past medical history on file.    ROS as stated in hpi  No chest pain, no shortness of breath, no abdominal pain       Objective:     /72 (BP Location: Left arm, Patient Position: Sitting)   Pulse 72   Temp 36.8 °C (98.3 °F) (Temporal)   Resp 16   Ht 1.88 m (6' 2\")   Wt 88 kg (194 lb)   SpO2 96%  Body mass index is 24.91 kg/m². stable.   Physical Exam:  Constitutional: Alert, no distress.  Skin: Warm, dry, good turgor,no cyanosis, no rashes in visible areas.  Eye: Equal, round and reactive, conjunctiva clear, lids normal.  Ears: No tenderness, no discharge.  External canals are without " any significant edema or erythema  Gross auditory acuity is intact.  Nose: symmetrical without tenderness, no discharge.  Mouth/Throat: lips without lesion.  Oropharynx clear.    Neck: Trachea midline, no masses, no obvious thyroid enlargement..Range of motion within normal limits.  Neuro: Cranial nerves 2-12 grossly intact.  No sensory deficit.  Respiratory: Unlabored respiratory effor  Cardiovascular: Normal S1, S2, no murmur, no edema.  Psych: Alert and oriented x3, normal affect and mood and judgement.        Assessment and Plan:   The following treatment plan was discussed    1. Hospital discharge follow-up  New issue.  Recent hospitalization for chest pain.  Negative cardiac testing.  All testing results reviewed today with patient at this visit.  Suspect anxiety component.   See #2    2. Anxiety  Chronic, ongoing, worsening.  Will start 50 mg sertraline at this time.  Encouraged daily exercise, good diet.  Handouts provided.  encouarged therapy, but he declines at this time.  Return in 4 weeks to monitor meds.     3. Seasonal allergies  Chronic, ongoing, worsening.  Advised to take daily claritin, allegra or zyrtec.  Monitor and follow.     4. Chest pain, unspecified type  See #1      Followup: Return in about 4 weeks (around 4/27/2020) for medication for anxiety /allergy follow up.         Educated in proper administration of medication(s) ordered today including safety, possible SE, risks, benefits, rationale and alternatives to therapy.     Please note that this dictation was created using voice recognition software. I have made every reasonable attempt to correct obvious errors, but I expect that there are errors of grammar and possibly content that I did not discover before finalizing the note.

## 2020-04-13 ENCOUNTER — TELEPHONE (OUTPATIENT)
Dept: SLEEP MEDICINE | Facility: MEDICAL CENTER | Age: 35
End: 2020-04-13

## 2020-04-13 ENCOUNTER — TELEMEDICINE (OUTPATIENT)
Dept: SLEEP MEDICINE | Facility: MEDICAL CENTER | Age: 35
End: 2020-04-13
Payer: OTHER GOVERNMENT

## 2020-04-13 VITALS — BODY MASS INDEX: 23.59 KG/M2 | HEIGHT: 73 IN | WEIGHT: 178 LBS

## 2020-04-13 DIAGNOSIS — G47.30 SLEEP DISORDER BREATHING: ICD-10-CM

## 2020-04-13 PROCEDURE — 99203 OFFICE O/P NEW LOW 30 MIN: CPT | Mod: GT,CR | Performed by: FAMILY MEDICINE

## 2020-04-13 ASSESSMENT — FIBROSIS 4 INDEX: FIB4 SCORE: 0.63

## 2020-04-13 NOTE — PROGRESS NOTES
"  Telemedicine Visit: New Patient     This encounter was conducted via Zoom .   Verbal consent was obtained. Patient's identity was verified.             [facility]  Consult Note     Date: 4/13/2020 / Time: 1:59 PM    Patient ID:   Name:             Romero Baugh     YOB: 1985  Age:                 34 y.o.  male   MRN:               [id number]      Thank you for requesting a sleep medicine consultation on Romero Baugh at the sleep center. Snoring and gasping presents today with the chief complaints of snoring and non restful sleep. He is referred by Anel Jaffe for evaluation and treatment of sleep disorder breathing.     HISTORY OF PRESENT ILLNESS:       At night,  Romero Baugh goes to bed around 10 pm on weekdays and around 12 am on the weekends. He gets out of bed at 7 am on weekdays and at 9 am on the weekends.  He  Averages about 6 hrs of sleep on a good night and 2-3 hrs on a bad night. Pt has bad nights about 1-2 nights per week. He falls asleep within 2-5 minutes. He wakes few times during the night due to gasping or for no obvious reasons. It takes him few min to fall back asleep. He also denies afternoon naps    He is aware of snoring/breathing pauses/gasping or choking in sleep.  He  denies any symptoms of restless legs syndrome such as an \"urge to move\"  He  legs in the evening or bedtime. He  denies any symptoms of narcolepsy such as sleep paralysis or cataplexy, or any symptoms to suggest parasomnias such as sleep walking or acting out of dreams. He  has not used any medications for the sleep problem.    Overall, he  doesnot finds his sleep refreshing.In terms of  excessive daytime sleepiness,  He complains of sleepiness while  at work, while reading or watching TV  However denies while driving.       REVIEW OF SYSTEMS:       Constitutional: Denies fevers, Denies weight changes  Eyes: Denies changes in vision, no eye pain  Ears/Nose/Throat/Mouth: Denies nasal " "congestion or sore throat   Cardiovascular: Denies chest pain or palpitations   Respiratory: Denies shortness of breath , Denies cough  Gastrointestinal/Hepatic: Denies abdominal pain, nausea, vomiting, diarrhea, constipation or GI bleeding   Genitourinary: Denies bladder dysfunction, dysuria or frequency  Musculoskeletal/Rheum: Denies  joint pain and swelling   Skin/Breast: Denies rash  Neurological: Denies headache, confusion, memory loss or focal weakness/parasthesias  Psychiatric: denies mood disorder     Comprehensive review of systems form is reviewed with the patient and is attached in the EMR.     PMH:  has a past medical history of Chickenpox.  MEDS:   Current Outpatient Medications:   •  sertraline (ZOLOFT) 50 MG Tab, Take 1 Tab by mouth every day., Disp: 30 Tab, Rfl: 1  •  loratadine (CLARITIN) 10 MG Tab, Take 1 Tab by mouth every day., Disp: 30 Tab, Rfl: 11  ALLERGIES:   Allergies   Allergen Reactions   • Iodine    • Shellfish Allergy      SURGHX:   Past Surgical History:   Procedure Laterality Date   • SEPTORHINOPLASTY  2009     SOCHX:  reports that he quit smoking about 4 years ago. His smoking use included cigarettes. He has a 15.00 pack-year smoking history. He has quit using smokeless tobacco. He reports current alcohol use. He reports that he does not use drugs.  FH:   Family History   Problem Relation Age of Onset   • Heart Disease Mother         valve replacment   • Hypertension Father    • Stroke Father    • Alcohol/Drug Father    • Sleep Apnea Neg Hx            Physical Exam:  Vitals/ General Appearance:   Weight/BMI: Body mass index is 23.48 kg/m².  Ht 1.854 m (6' 1\")   Wt 80.7 kg (178 lb)   Vitals:    04/13/20 1342   Weight: 80.7 kg (178 lb)   Height: 1.854 m (6' 1\")       Pt. is alert and oriented to time, place and person. Cooperative and in no apparent distress.       Constitutional: Alert, no distress, well-groomed.  Skin: No rashes in visible areas.  Eye: Round. Conjunctiva clear, No " icterus.   ENMT: Lips pink without lesions, good dentition, moist mucous membranes. Phonation normal.  Neck: No masses, Moves freely without pain.  CV: Pulse as reported by patient  Respiratory: Unlabored respiratory effort, no cough or audible wheeze  Psych: Alert and oriented x3, normal affect and mood.     INVESTIGATIONS:       ASSESSMENT AND PLAN     1. He  has symptoms of Obstructive Sleep Apnea (DAVID).     The pathophysiology of DAVID and the increased risk of cardiovascular morbidity from untreated DAVID is discussed in detail with the patient.      We have discussed diagnostic options including in-laboratory, attended polysomnography and home sleep testing. We have also discussed treatment options including airway pressurization, reconstructive otolaryngologic surgery, dental appliances and weight management.       Subsequently,treatment options will be discussed based on the diagnostic study. Meanwhile, He is urged to avoid supine sleep, weight gain and alcoholic beverages since all of these can worsen DAVID. He is cautioned against drowsy driving. If He feels sleepy while driving, He must pull over for a break/nap, rather than persist on the road, in the interest of He own safety and that of others on the road.    Plan  -  He  will be scheduled for an overnight  HST to assess sleep related breathing disorder.    2.. Regarding treatment of other past medical problems and general health maintenance,  He is urged to follow up with PCP.

## 2020-04-13 NOTE — TELEPHONE ENCOUNTER
LVM for the pt wanting to schedule HST that was order per Dr. Ramsey and FV apt for the results. I advise the pt to give us a call back to schedule those apt's

## 2020-04-20 ENCOUNTER — TELEPHONE (OUTPATIENT)
Dept: SLEEP MEDICINE | Facility: MEDICAL CENTER | Age: 35
End: 2020-04-20

## 2020-04-20 DIAGNOSIS — G47.33 OSA (OBSTRUCTIVE SLEEP APNEA): ICD-10-CM

## 2020-04-20 NOTE — TELEPHONE ENCOUNTER
This patient has an order for HST.   doesn’t cover home sleep studies.    He will need an in lab, please sign.

## 2020-04-24 ENCOUNTER — TELEPHONE (OUTPATIENT)
Dept: SLEEP MEDICINE | Facility: MEDICAL CENTER | Age: 35
End: 2020-04-24

## 2020-04-24 NOTE — TELEPHONE ENCOUNTER
Called pt to schedule in lab SS and FV apt that was order per Dr. Ramsey. I explained to the pt that Dr. Ramsey first order the pt a HST but his insurance will not cover that test so he would need an in lab SS. The pt understood and I was able to schedule his SS on 5/28/2020 @ 9:05 and when I attempted to schedule FV I informed the pt that we are backing out pretty far and he informed me that he will be leaving town on July 1st and will be moving to [place]. I informed the pt that he can Mychart us once he compliance the test to get the results over [email or web address] and once he has a doctor in [place] we can send the records to them.

## 2020-05-05 ENCOUNTER — OFFICE VISIT (OUTPATIENT)
Dept: MEDICAL GROUP | Facility: PHYSICIAN GROUP | Age: 35
End: 2020-05-05
Payer: OTHER GOVERNMENT

## 2020-05-05 VITALS
DIASTOLIC BLOOD PRESSURE: 74 MMHG | SYSTOLIC BLOOD PRESSURE: 112 MMHG | TEMPERATURE: 97.2 F | WEIGHT: 187 LBS | BODY MASS INDEX: 24.78 KG/M2 | RESPIRATION RATE: 16 BRPM | HEART RATE: 60 BPM | HEIGHT: 73 IN | OXYGEN SATURATION: 97 %

## 2020-05-05 DIAGNOSIS — F41.9 ANXIETY: ICD-10-CM

## 2020-05-05 PROCEDURE — 99214 OFFICE O/P EST MOD 30 MIN: CPT | Performed by: NURSE PRACTITIONER

## 2020-05-05 ASSESSMENT — FIBROSIS 4 INDEX: FIB4 SCORE: 0.63

## 2020-05-05 NOTE — PROGRESS NOTES
"Chief Complaint   Patient presents with   • Follow-Up   • Medication Management       Subjective:   Romero Baugh is a 34 y.o. male here today for evaluation and management of acute anxiety    Anxiety  Improved anxiety.  Bicycling and dietary changes.  Feeling better, with less panic.  Has improvement with premature ejaculation.  Moving to [place] in July.  He is feeling improved.  No reported suicidal ideation.           Current medicines (including changes today)  Current Outpatient Medications   Medication Sig Dispense Refill   • sertraline (ZOLOFT) 50 MG Tab Take 1 Tab by mouth every day. 30 Tab 1   • loratadine (CLARITIN) 10 MG Tab Take 1 Tab by mouth every day. 30 Tab 11     No current facility-administered medications for this visit.      He  has a past medical history of Chickenpox.    ROS as stated in hpi  No chest pain, no shortness of breath, no abdominal pain       Objective:     /74 (BP Location: Left arm, Patient Position: Sitting)   Pulse 60   Temp 36.2 °C (97.2 °F) (Temporal)   Resp 16   Ht 1.854 m (6' 1\")   Wt 84.8 kg (187 lb)   SpO2 97%  Body mass index is 24.67 kg/m². stable.   Physical Exam:  Constitutional: Alert, no distress.  Skin: Warm, dry, good turgor,no cyanosis, no rashes in visible areas.  Eye: Equal, round and reactive, conjunctiva clear, lids normal.  Ears: No tenderness, no discharge.  External canals are without any significant edema or erythema  Gross auditory acuity is intact.  Nose: symmetrical without tenderness, no discharge.  Mouth/Throat: lips without lesion.  Oropharynx clear.  Neck: Trachea midline, no masses, no obvious thyroid enlargement. Range of motion within normal limits.  Neuro: Cranial nerves 2-12 grossly intact.  No sensory deficit.  Respiratory: Unlabored respiratory effort,   Psych: Alert and oriented x3, normal affect and mood and judgement.        Assessment and Plan:   The following treatment plan was discussed    1. Anxiety  Acute issue, improved " with one month of sertraline.  Continue with 50 mg dose.  Refill provided today.  Continue healthy diet,, exercise goals.  Will cover patient for 90 days as he is moving to [place] in July. Monitor and follow.       Followup: Return in about 6 months (around 11/5/2020).         Educated in proper administration of medication(s) ordered today including safety, possible SE, risks, benefits, rationale and alternatives to therapy.     Please note that this dictation was created using voice recognition software. I have made every reasonable attempt to correct obvious errors, but I expect that there are errors of grammar and possibly content that I did not discover before finalizing the note.

## 2020-05-05 NOTE — ASSESSMENT & PLAN NOTE
Improved anxiety.  Bicycling and dietary changes.  Feeling better, with less panic.  Has improvement with premature ejaculation.  Moving to [place] in July.  He is feeling improved.  No reported suicidal ideation.    Refills requested

## 2020-05-12 ENCOUNTER — TELEPHONE (OUTPATIENT)
Dept: MEDICAL GROUP | Facility: PHYSICIAN GROUP | Age: 35
End: 2020-05-12

## 2020-05-12 DIAGNOSIS — R06.83 SNORING: ICD-10-CM

## 2020-05-12 NOTE — TELEPHONE ENCOUNTER
----- Message from James Marley sent at 5/12/2020  7:02 AM PDT -----  Regarding: NEW REFERRAL TO [facility]  Hi,    This patient has an upcoming sleep study scheduled but  requires a new referral to the sleep center.  Patient saw Dr Cassie Ramsey last.  Could you please place new referral?    Thank you,  James

## 2020-05-28 ENCOUNTER — APPOINTMENT (OUTPATIENT)
Dept: SLEEP MEDICINE | Facility: MEDICAL CENTER | Age: 35
End: 2020-05-28
Attending: FAMILY MEDICINE
Payer: OTHER GOVERNMENT

## 2024-09-23 NOTE — ED NOTES
Flu symptoms for over 1 week, fever. This is his second round of flu in 2 weeks.  
Med rec complete per PT at bedside  Allergies reviewed  No ABX in last 30 days   
Pt received d/c instr; pt verbalized understanding. Pt amb to lobby s/p d/c  
Admission